# Patient Record
Sex: FEMALE | Race: WHITE | Employment: UNEMPLOYED | URBAN - METROPOLITAN AREA
[De-identification: names, ages, dates, MRNs, and addresses within clinical notes are randomized per-mention and may not be internally consistent; named-entity substitution may affect disease eponyms.]

---

## 2018-04-28 ENCOUNTER — APPOINTMENT (OUTPATIENT)
Dept: GENERAL RADIOLOGY | Age: 48
End: 2018-04-28
Attending: EMERGENCY MEDICINE
Payer: MEDICAID

## 2018-04-28 ENCOUNTER — HOSPITAL ENCOUNTER (EMERGENCY)
Age: 48
Discharge: HOME | End: 2018-04-28
Attending: EMERGENCY MEDICINE
Payer: MEDICAID

## 2018-04-28 VITALS
OXYGEN SATURATION: 99 % | HEART RATE: 94 BPM | TEMPERATURE: 98.3 F | DIASTOLIC BLOOD PRESSURE: 61 MMHG | WEIGHT: 180 LBS | RESPIRATION RATE: 20 BRPM | BODY MASS INDEX: 28.93 KG/M2 | SYSTOLIC BLOOD PRESSURE: 99 MMHG | HEIGHT: 66 IN

## 2018-04-28 DIAGNOSIS — J20.9 ACUTE BRONCHITIS, UNSPECIFIED ORGANISM: Primary | ICD-10-CM

## 2018-04-28 PROCEDURE — 99282 EMERGENCY DEPT VISIT SF MDM: CPT

## 2018-04-28 PROCEDURE — 94640 AIRWAY INHALATION TREATMENT: CPT

## 2018-04-28 PROCEDURE — 74011000250 HC RX REV CODE- 250: Performed by: EMERGENCY MEDICINE

## 2018-04-28 PROCEDURE — 77030013140 HC MSK NEB VYRM -A

## 2018-04-28 PROCEDURE — 71046 X-RAY EXAM CHEST 2 VIEWS: CPT

## 2018-04-28 RX ORDER — ALBUTEROL SULFATE 90 UG/1
2 AEROSOL, METERED RESPIRATORY (INHALATION)
Qty: 1 INHALER | Refills: 0 | Status: SHIPPED | OUTPATIENT
Start: 2018-04-28 | End: 2019-01-16

## 2018-04-28 RX ORDER — PREDNISONE 10 MG/1
TABLET ORAL
Qty: 21 TAB | Refills: 0 | Status: SHIPPED | OUTPATIENT
Start: 2018-04-28 | End: 2019-01-16

## 2018-04-28 RX ORDER — ALBUTEROL SULFATE 0.83 MG/ML
5 SOLUTION RESPIRATORY (INHALATION)
Qty: 1 PACKAGE | Refills: 0 | Status: SHIPPED | OUTPATIENT
Start: 2018-04-28 | End: 2019-01-16

## 2018-04-28 RX ORDER — BENZONATATE 200 MG/1
200 CAPSULE ORAL
Qty: 20 CAP | Refills: 0 | Status: SHIPPED | OUTPATIENT
Start: 2018-04-28 | End: 2018-05-05

## 2018-04-28 RX ORDER — IPRATROPIUM BROMIDE AND ALBUTEROL SULFATE 2.5; .5 MG/3ML; MG/3ML
3 SOLUTION RESPIRATORY (INHALATION)
Status: COMPLETED | OUTPATIENT
Start: 2018-04-28 | End: 2018-04-28

## 2018-04-28 RX ORDER — AZITHROMYCIN 250 MG/1
TABLET, FILM COATED ORAL
Qty: 6 TAB | Refills: 0 | Status: SHIPPED | OUTPATIENT
Start: 2018-04-28 | End: 2018-05-03

## 2018-04-28 RX ADMIN — IPRATROPIUM BROMIDE AND ALBUTEROL SULFATE 3 ML: .5; 3 SOLUTION RESPIRATORY (INHALATION) at 10:42

## 2018-04-28 NOTE — DISCHARGE INSTRUCTIONS
Bronchitis: Care Instructions  Your Care Instructions    Bronchitis is inflammation of the bronchial tubes, which carry air to the lungs. The tubes swell and produce mucus, or phlegm. The mucus and inflamed bronchial tubes make you cough. You may have trouble breathing. Most cases of bronchitis are caused by viruses like those that cause colds. Antibiotics usually do not help and they may be harmful. Bronchitis usually develops rapidly and lasts about 2 to 3 weeks in otherwise healthy people. Follow-up care is a key part of your treatment and safety. Be sure to make and go to all appointments, and call your doctor if you are having problems. It's also a good idea to know your test results and keep a list of the medicines you take. How can you care for yourself at home? · Take all medicines exactly as prescribed. Call your doctor if you think you are having a problem with your medicine. · Get some extra rest.  · Take an over-the-counter pain medicine, such as acetaminophen (Tylenol), ibuprofen (Advil, Motrin), or naproxen (Aleve) to reduce fever and relieve body aches. Read and follow all instructions on the label. · Do not take two or more pain medicines at the same time unless the doctor told you to. Many pain medicines have acetaminophen, which is Tylenol. Too much acetaminophen (Tylenol) can be harmful. · Take an over-the-counter cough medicine that contains dextromethorphan to help quiet a dry, hacking cough so that you can sleep. Avoid cough medicines that have more than one active ingredient. Read and follow all instructions on the label. · Breathe moist air from a humidifier, hot shower, or sink filled with hot water. The heat and moisture will thin mucus so you can cough it out. · Do not smoke. Smoking can make bronchitis worse. If you need help quitting, talk to your doctor about stop-smoking programs and medicines. These can increase your chances of quitting for good.   When should you call for help? Call 911 anytime you think you may need emergency care. For example, call if:  ? · You have severe trouble breathing. ?Call your doctor now or seek immediate medical care if:  ? · You have new or worse trouble breathing. ? · You cough up dark brown or bloody mucus (sputum). ? · You have a new or higher fever. ? · You have a new rash. ? Watch closely for changes in your health, and be sure to contact your doctor if:  ? · You cough more deeply or more often, especially if you notice more mucus or a change in the color of your mucus. ? · You are not getting better as expected. Where can you learn more? Go to http://shante-benito.info/. Enter H333 in the search box to learn more about \"Bronchitis: Care Instructions. \"  Current as of: May 12, 2017  Content Version: 11.4  © 8359-0836 "[employer]. Care instructions adapted under license by [employer] (which disclaims liability or warranty for this information). If you have questions about a medical condition or this instruction, always ask your healthcare professional. [address] any warranty or liability for your use of this information.

## 2018-04-28 NOTE — ED TRIAGE NOTES
Pt reports wheezing unrelieved by home medications x 3 days. Pt speaking in full, clear sentences, SpO2 98% on RA. Pt with hx of tracheotomy r/t esophageal atresia. Pt reports associated body aches, chills, and productive cough.

## 2019-01-16 ENCOUNTER — APPOINTMENT (OUTPATIENT)
Dept: GENERAL RADIOLOGY | Age: 49
End: 2019-01-16
Attending: EMERGENCY MEDICINE
Payer: MEDICAID

## 2019-01-16 ENCOUNTER — HOSPITAL ENCOUNTER (EMERGENCY)
Age: 49
Discharge: HOME | End: 2019-01-16
Attending: EMERGENCY MEDICINE
Payer: MEDICAID

## 2019-01-16 VITALS
HEART RATE: 90 BPM | SYSTOLIC BLOOD PRESSURE: 119 MMHG | TEMPERATURE: 98.2 F | BODY MASS INDEX: 28.93 KG/M2 | WEIGHT: 180 LBS | DIASTOLIC BLOOD PRESSURE: 68 MMHG | OXYGEN SATURATION: 98 % | RESPIRATION RATE: 14 BRPM | HEIGHT: 66 IN

## 2019-01-16 DIAGNOSIS — J20.9 ACUTE BRONCHITIS WITH BRONCHOSPASM: Primary | ICD-10-CM

## 2019-01-16 LAB
FLUAV AG NPH QL IA: NEGATIVE
FLUBV AG NOSE QL IA: NEGATIVE

## 2019-01-16 PROCEDURE — 87804 INFLUENZA ASSAY W/OPTIC: CPT

## 2019-01-16 PROCEDURE — 99281 EMR DPT VST MAYX REQ PHY/QHP: CPT

## 2019-01-16 PROCEDURE — 71046 X-RAY EXAM CHEST 2 VIEWS: CPT

## 2019-01-16 RX ORDER — ALBUTEROL SULFATE 1.25 MG/3ML
1.25 SOLUTION RESPIRATORY (INHALATION)
Qty: 25 EACH | Refills: 0 | Status: SHIPPED | OUTPATIENT
Start: 2019-01-16

## 2019-01-16 RX ORDER — PREDNISONE 10 MG/1
TABLET ORAL
Qty: 21 TAB | Refills: 0 | Status: SHIPPED | OUTPATIENT
Start: 2019-01-16 | End: 2021-07-18

## 2019-01-16 RX ORDER — AZITHROMYCIN 250 MG/1
TABLET, FILM COATED ORAL
Qty: 6 TAB | Refills: 0 | Status: SHIPPED | OUTPATIENT
Start: 2019-01-16 | End: 2019-01-21

## 2019-01-16 NOTE — ED PROVIDER NOTES
EMERGENCY DEPARTMENT HISTORY AND PHYSICAL EXAM 
 
Date: 1/16/2019 Patient Name: Clearance Mode History of Presenting Illness Chief Complaint Patient presents with  Cough History Provided By: patient Chief Complaint: cough Duration: 1 week Timing: acute Location: respiratory Modifying Factors: not improved with nebulizer Associated Symptoms: wheezing, congestion, body aches Additional History (Context):  
11:05 AM 
Clearance Mode is a 46 y.o. female with PMHX of asthma, hypothyroidism, pulmonary fibrosis, and esophageal atresia with tracheostomy who presents to the emergency department C/O productive cough with green phlegm productive for the past week. Associated sxs include wheezing, congestion, and body aches today. Pt gets infections frequently. Pt denies receiving flu vaccine this season. She has been using her nebulizer with minimal relief. Pt denies fever, chills, recent sick contacts, and any other sxs or complaints. PCP: Marcin Cortez NP Current Outpatient Medications Medication Sig Dispense Refill  albuterol (ACCUNEB) 1.25 mg/3 mL nebu Take 3 mL by inhalation every four (4) hours as needed (wheezing). 25 Each 0  
 azithromycin (ZITHROMAX Z-JOAN) 250 mg tablet Take 2 pills on day 1 and 1 pill on days 2-5 6 Tab 0  predniSONE (STERAPRED DS) 10 mg dose pack Take with food. 21 Tab 0  
 QUEtiapine (SEROQUEL) 100 mg tablet Take 100 mg by mouth two (2) times a day.  polyethylene glycol (MIRALAX) 17 gram packet Take 1 Packet by mouth daily. 5 Packet 0  
 levothyroxine (SYNTHROID) 50 mcg tablet Take  by mouth Daily (before breakfast).  buPROPion (WELLBUTRIN) 100 mg tablet Take 100 mg by mouth two (2) times a day.  divalproex DR (DEPAKOTE) 250 mg tablet Take 500 mg by mouth three (3) times daily.  clonazePAM (KLONOPIN) 1 mg tablet Take 1 mg by mouth three (3) times daily.  dicyclomine (BENTYL) 20 mg tablet Take 20 mg by mouth every six (6) hours.  ondansetron (ZOFRAN ODT) 4 mg disintegrating tablet Take 1 Tab by mouth every eight (8) hours as needed for Nausea. 12 Tab 0  
 ondansetron (ZOFRAN ODT) 4 mg disintegrating tablet Take 1 Tab by mouth every eight (8) hours as needed for Nausea. 6 Tab 0  
 tiotropium (SPIRIVA WITH HANDIHALER) 18 mcg inhalation capsule Take 1 Cap by inhalation daily.  guaiFENesin-codeine (ROBITUSSIN AC)  mg/5 mL solution Take 5-10 mL by mouth four (4) times daily as needed for Cough. Max Daily Amount: 40 mL. 100 mL 0  
 naproxen (NAPROSYN) 500 mg tablet Take 500 mg by mouth two (2) times daily (with meals). Past History Past Medical History: 
Past Medical History:  
Diagnosis Date  Asthma  Bulimia   
 i throw up alot  Gastrointestinal disorder   
 ibs  Hypothyroid  Ill-defined condition   
 pre-diabetes  Osteoarthritis  Psychiatric disorder   
 bipolar  Pulmonary fibrosis ([facility] [address]  Tracheostomy care ([facility] [address] Past Surgical History: 
Past Surgical History:  
Procedure Laterality Date  ABDOMEN SURGERY PROC UNLISTED    
 hysterectomy  HX ORTHOPAEDIC    
 scope to right knee  HX OTHER SURGICAL    
 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 
History reviewed. No pertinent family history. Social History: 
Social History Tobacco Use  Smoking status: Never Smoker  Smokeless tobacco: Never Used Substance Use Topics  Alcohol use: No  
 Drug use: No  
 
 
Allergies: Allergies Allergen Reactions  Compazine [Prochlorperazine Edisylate] Anaphylaxis  Sulfa (Sulfonamide Antibiotics) Anaphylaxis  Sulfa Dyne Anaphylaxis Review of Systems Review of Systems Constitutional: Negative for chills and fever. HENT: Negative for sore throat. Respiratory: Positive for cough and wheezing. Gastrointestinal: Negative for nausea and vomiting. Musculoskeletal: Positive for myalgias. Neurological: Negative for dizziness. Hematological: Negative for adenopathy. All other systems reviewed and are negative. Physical Exam  
 
Vitals:  
 01/16/19 1045 BP: 119/68 Pulse: 90 Resp: 14 Temp: 98.2 °F (36.8 °C) SpO2: 98% Weight: 81.6 kg (180 lb) Height: 5' 6\" (1.676 m) Physical Exam  
Constitutional: She is oriented to person, place, and time. She appears well-developed and well-nourished. No distress.  female in NAD. Alert. No resp distress or acc muscle use. HENT:  
Head: Normocephalic and atraumatic. Right Ear: External ear normal. No swelling or tenderness. Tympanic membrane is not perforated, not erythematous and not bulging. Left Ear: External ear normal. No swelling or tenderness. Tympanic membrane is not perforated, not erythematous and not bulging. Nose: Mucosal edema present. No rhinorrhea. Right sinus exhibits no maxillary sinus tenderness and no frontal sinus tenderness. Left sinus exhibits no maxillary sinus tenderness and no frontal sinus tenderness. Mouth/Throat: Uvula is midline, oropharynx is clear and moist and mucous membranes are normal. No oral lesions. No trismus in the jaw. No dental abscesses or uvula swelling. No oropharyngeal exudate, posterior oropharyngeal edema, posterior oropharyngeal erythema or tonsillar abscesses. Eyes: Conjunctivae are normal.  
Neck: Normal range of motion. Cardiovascular: Normal rate, regular rhythm, normal heart sounds and intact distal pulses. Exam reveals no gallop and no friction rub. No murmur heard. Pulmonary/Chest: Effort normal and breath sounds normal. No accessory muscle usage. No tachypnea. No respiratory distress. She has no decreased breath sounds. She has no wheezes. She has no rhonchi. She has no rales. Musculoskeletal: Normal range of motion. Neurological: She is alert and oriented to person, place, and time. Skin: Skin is warm and dry. She is not diaphoretic. Psychiatric: She has a normal mood and affect. Judgment normal.  
Nursing note and vitals reviewed. Diagnostic Study Results Labs - Recent Results (from the past 12 hour(s)) INFLUENZA A & B AG (RAPID TEST) Collection Time: 01/16/19 11:30 AM  
Result Value Ref Range Influenza A Antigen NEGATIVE  NEG Influenza B Antigen NEGATIVE  NEG Radiologic Studies -  
XR CHEST PA LAT Final Result Impression: No acute cardiopulmonary disease or significant interval change. CT Results  (Last 48 hours) None CXR Results  (Last 48 hours) 01/16/19 1106  XR CHEST PA LAT Final result Impression:  Impression: No acute cardiopulmonary disease or significant interval change. Narrative:  HISTORY:   
-From Provider: cough  
-Additional: None Technique: CHEST PA AND LATERAL VIEWS Comparison study:04/28/18. Findings:  
   
 No acute consolidation, congestive heart failure, pleural effusion or  
pneumothorax. There is fusion of the right 4th and 5th posterior ribs, stable  
from the prior. Medications given in the ED- Medications - No data to display Medical Decision Making I am the first provider for this patient. I reviewed the vital signs, available nursing notes, past medical history, past surgical history, family history and social history. Vital Signs-Reviewed the patient's vital signs. Pulse Oximetry Analysis - 98% on RA Records Reviewed: Nursing Notes and Old Medical Records Provider Notes (Medical Decision Making): URI, strep, sinusitis, allergic, PNA, bronchitis, asthma, allergic Procedures: 
Procedures ED Course:  
11:05 AM Initial assessment performed.  The patients presenting problems have been discussed, and they are in agreement with the care plan formulated and outlined with them. I have encouraged them to ask questions as they arise throughout their visit. Diagnosis and Disposition Afebrile. Lungs CTAB. No resp distress or acc muscle use. CXR clear. Influenza neg. Will tx with ABX given hx. PCP FU. Reasons to RTED discussed with pt. All questions answered. Pt feels comfortable going home at this time. Pt expressed understanding and she agrees with plan. DISCHARGE NOTE: 
 
Tang Deleon's  results have been reviewed with her. She has been counseled regarding her diagnosis, treatment, and plan. She verbally conveys understanding and agreement of the signs, symptoms, diagnosis, treatment and prognosis and additionally agrees to follow up as discussed. She also agrees with the care-plan and conveys that all of her questions have been answered. I have also provided discharge instructions for her that include: educational information regarding their diagnosis and treatment, and list of reasons why they would want to return to the ED prior to their follow-up appointment, should her condition change. She has been provided with education for proper emergency department utilization. CLINICAL IMPRESSION: 
 
1. Acute bronchitis with bronchospasm PLAN: 
1. D/C Home 2. Current Discharge Medication List  
  
START taking these medications Details  
albuterol (ACCUNEB) 1.25 mg/3 mL nebu Take 3 mL by inhalation every four (4) hours as needed (wheezing). Qty: 25 Each, Refills: 0  
  
azithromycin (ZITHROMAX Z-JOAN) 250 mg tablet Take 2 pills on day 1 and 1 pill on days 2-5 Qty: 6 Tab, Refills: 0 CONTINUE these medications which have CHANGED Details  
predniSONE (STERAPRED DS) 10 mg dose pack Take with food. Qty: 21 Tab, Refills: 0 STOP taking these medications  
  
 albuterol (PROVENTIL HFA, VENTOLIN HFA, PROAIR HFA) 90 mcg/actuation inhaler Comments: Reason for Stopping:   
   
 albuterol (PROVENTIL HFA, VENTOLIN HFA, PROAIR HFA) 90 mcg/actuation inhaler Comments:  
Reason for Stopping:   
   
 albuterol (PROVENTIL VENTOLIN) 2.5 mg /3 mL (0.083 %) nebulizer solution Comments:  
Reason for Stopping: 3.  
Follow-up Information Follow up With Specialties Details Why Contact Info Zhang Stout NP Nurse Practitioner   [address] [facility] [address] [facility] [address] 
[phone] [facility] EMERGENCY DEPT Emergency Medicine  As needed, If symptoms worsen [address] [place] [address] 
[phone]  
  
 
_______________________________ Attestations: This note is prepared by Jannie Gaston, acting as Scribe for Meagan Tiwari PA-C. Meagan Tiwari PA-C:  The scribe's documentation has been prepared under my direction and personally reviewed by me in its entirety. I confirm that the note above accurately reflects all work, treatment, procedures, and medical decision making performed by me.

## 2019-01-16 NOTE — ED TRIAGE NOTES
Patient arrived ambulatory c/o productive cough x1 week. Sputum is green per pt report. Patient also reports body aches.

## 2019-10-23 ENCOUNTER — HOSPITAL ENCOUNTER (EMERGENCY)
Age: 49
Discharge: HOME | End: 2019-10-23
Attending: EMERGENCY MEDICINE
Payer: MEDICAID

## 2019-10-23 ENCOUNTER — APPOINTMENT (OUTPATIENT)
Dept: GENERAL RADIOLOGY | Age: 49
End: 2019-10-23
Attending: PHYSICIAN ASSISTANT
Payer: MEDICAID

## 2019-10-23 VITALS
RESPIRATION RATE: 14 BRPM | BODY MASS INDEX: 28.93 KG/M2 | DIASTOLIC BLOOD PRESSURE: 81 MMHG | SYSTOLIC BLOOD PRESSURE: 119 MMHG | OXYGEN SATURATION: 97 % | WEIGHT: 180 LBS | HEIGHT: 66 IN | TEMPERATURE: 98 F | HEART RATE: 95 BPM

## 2019-10-23 DIAGNOSIS — S16.1XXA STRAIN OF NECK MUSCLE, INITIAL ENCOUNTER: ICD-10-CM
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
## 2019-10-23 PROCEDURE — 99283 EMERGENCY DEPT VISIT LOW MDM: CPT

## 2019-10-23 PROCEDURE — 72050 X-RAY EXAM NECK SPINE 4/5VWS: CPT

## 2019-10-23 RX ORDER — OMEPRAZOLE 10 MG/1
10 CAPSULE, DELAYED RELEASE ORAL DAILY
COMMUNITY

## 2019-10-23 RX ORDER — CYCLOBENZAPRINE HCL 10 MG
10 TABLET ORAL
Qty: 15 TAB | Refills: 0 | Status: SHIPPED | OUTPATIENT
Start: 2019-10-23 | End: 2021-07-18
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
## 2019-11-24 ENCOUNTER — APPOINTMENT (OUTPATIENT)
Dept: CT IMAGING | Age: 49
End: 2019-11-24
Attending: EMERGENCY MEDICINE
Payer: MEDICAID

## 2019-11-24 ENCOUNTER — APPOINTMENT (OUTPATIENT)
Dept: GENERAL RADIOLOGY | Age: 49
End: 2019-11-24
Attending: EMERGENCY MEDICINE
Payer: MEDICAID

## 2019-11-24 ENCOUNTER — HOSPITAL ENCOUNTER (EMERGENCY)
Age: 49
Discharge: HOME | End: 2019-11-24
Attending: EMERGENCY MEDICINE
Payer: MEDICAID

## 2019-11-24 VITALS
BODY MASS INDEX: 28.12 KG/M2 | HEART RATE: 73 BPM | SYSTOLIC BLOOD PRESSURE: 117 MMHG | OXYGEN SATURATION: 100 % | HEIGHT: 66 IN | DIASTOLIC BLOOD PRESSURE: 63 MMHG | TEMPERATURE: 98.5 F | RESPIRATION RATE: 14 BRPM | WEIGHT: 175 LBS

## 2019-11-24 DIAGNOSIS — R20.0 LEFT ARM NUMBNESS: Primary | ICD-10-CM

## 2019-11-24 DIAGNOSIS — R41.0 TRANSIENT CONFUSION: ICD-10-CM

## 2019-11-24 LAB
ALBUMIN SERPL-MCNC: 3.8 G/DL (ref 3.4–5)
ALBUMIN/GLOB SERPL: 1 {RATIO} (ref 0.8–1.7)
ALP SERPL-CCNC: 68 U/L (ref 45–117)
ALT SERPL-CCNC: 18 U/L (ref 13–56)
ANION GAP SERPL CALC-SCNC: 8 MMOL/L (ref 3–18)
APPEARANCE UR: CLEAR
AST SERPL-CCNC: 8 U/L (ref 10–38)
BASOPHILS # BLD: 0.1 K/UL (ref 0–0.1)
BASOPHILS NFR BLD: 1 % (ref 0–2)
BILIRUB SERPL-MCNC: 0.2 MG/DL (ref 0.2–1)
BILIRUB UR QL: NEGATIVE
BNP SERPL-MCNC: 24 PG/ML (ref 0–900)
BUN SERPL-MCNC: 21 MG/DL (ref 7–18)
BUN/CREAT SERPL: 24 (ref 12–20)
CALCIUM SERPL-MCNC: 8.7 MG/DL (ref 8.5–10.1)
CHLORIDE SERPL-SCNC: 103 MMOL/L (ref 100–111)
CK MB CFR SERPL CALC: NORMAL % (ref 0–4)
CK MB SERPL-MCNC: <1 NG/ML (ref 5–25)
CK SERPL-CCNC: 60 U/L (ref 26–192)
CO2 SERPL-SCNC: 28 MMOL/L (ref 21–32)
COLOR UR: YELLOW
CREAT SERPL-MCNC: 0.87 MG/DL (ref 0.6–1.3)
DIFFERENTIAL METHOD BLD: NORMAL
EOSINOPHIL # BLD: 0.1 K/UL (ref 0–0.4)
EOSINOPHIL NFR BLD: 2 % (ref 0–5)
ERYTHROCYTE [DISTWIDTH] IN BLOOD BY AUTOMATED COUNT: 12.2 % (ref 11.6–14.5)
GLOBULIN SER CALC-MCNC: 3.7 G/DL (ref 2–4)
GLUCOSE SERPL-MCNC: 105 MG/DL (ref 74–99)
GLUCOSE UR STRIP.AUTO-MCNC: NEGATIVE MG/DL
HCT VFR BLD AUTO: 40.8 % (ref 35–45)
HGB BLD-MCNC: 13.4 G/DL (ref 12–16)
HGB UR QL STRIP: NEGATIVE
KETONES UR QL STRIP.AUTO: NEGATIVE MG/DL
LEUKOCYTE ESTERASE UR QL STRIP.AUTO: NEGATIVE
LYMPHOCYTES # BLD: 2.6 K/UL (ref 0.9–3.6)
LYMPHOCYTES NFR BLD: 37 % (ref 21–52)
MCH RBC QN AUTO: 31 PG (ref 24–34)
MCHC RBC AUTO-ENTMCNC: 32.8 G/DL (ref 31–37)
MCV RBC AUTO: 94.4 FL (ref 74–97)
MONOCYTES # BLD: 0.6 K/UL (ref 0.05–1.2)
MONOCYTES NFR BLD: 8 % (ref 3–10)
NEUTS SEG # BLD: 3.6 K/UL (ref 1.8–8)
NEUTS SEG NFR BLD: 52 % (ref 40–73)
NITRITE UR QL STRIP.AUTO: NEGATIVE
PH UR STRIP: 6.5 [PH] (ref 5–8)
PLATELET # BLD AUTO: 254 K/UL (ref 135–420)
PMV BLD AUTO: 10.1 FL (ref 9.2–11.8)
POTASSIUM SERPL-SCNC: 3.6 MMOL/L (ref 3.5–5.5)
PROT SERPL-MCNC: 7.5 G/DL (ref 6.4–8.2)
PROT UR STRIP-MCNC: NEGATIVE MG/DL
RBC # BLD AUTO: 4.32 M/UL (ref 4.2–5.3)
SODIUM SERPL-SCNC: 139 MMOL/L (ref 136–145)
SP GR UR REFRACTOMETRY: 1.02 (ref 1–1.03)
TROPONIN I SERPL-MCNC: <0.02 NG/ML (ref 0–0.04)
UROBILINOGEN UR QL STRIP.AUTO: 1 EU/DL (ref 0.2–1)
WBC # BLD AUTO: 6.9 K/UL (ref 4.6–13.2)

## 2019-11-24 PROCEDURE — 83880 ASSAY OF NATRIURETIC PEPTIDE: CPT

## 2019-11-24 PROCEDURE — 71045 X-RAY EXAM CHEST 1 VIEW: CPT

## 2019-11-24 PROCEDURE — 99284 EMERGENCY DEPT VISIT MOD MDM: CPT

## 2019-11-24 PROCEDURE — 80053 COMPREHEN METABOLIC PANEL: CPT

## 2019-11-24 PROCEDURE — 70450 CT HEAD/BRAIN W/O DYE: CPT

## 2019-11-24 PROCEDURE — 82550 ASSAY OF CK (CPK): CPT

## 2019-11-24 PROCEDURE — 93005 ELECTROCARDIOGRAM TRACING: CPT

## 2019-11-24 PROCEDURE — 85025 COMPLETE CBC W/AUTO DIFF WBC: CPT

## 2019-11-24 PROCEDURE — 81003 URINALYSIS AUTO W/O SCOPE: CPT

## 2019-11-25 LAB
ATRIAL RATE: 74 BPM
CALCULATED P AXIS, ECG09: 23 DEGREES
CALCULATED R AXIS, ECG10: 2 DEGREES
CALCULATED T AXIS, ECG11: -4 DEGREES
DIAGNOSIS, 93000: NORMAL
P-R INTERVAL, ECG05: 144 MS
Q-T INTERVAL, ECG07: 414 MS
QRS DURATION, ECG06: 88 MS
QTC CALCULATION (BEZET), ECG08: 459 MS
VENTRICULAR RATE, ECG03: 74 BPM

## 2019-11-25 NOTE — ED TRIAGE NOTES
Pt C/O left arm pain x a few hours and patient also C/O feeling confused. Pt drove self here and has been able to answer all questions appropriately.

## 2019-11-25 NOTE — ED PROVIDER NOTES
EMERGENCY DEPARTMENT HISTORY AND PHYSICAL EXAM    Date: 11/24/2019  Patient Name: Mago Aldrich    History of Presenting Illness     Chief Complaint   Patient presents with    Arm Pain         History Provided By: Patient      Mago Aldrich is a 44 y.o. female with PMHX of esophageal atresia with prior tracheostomy, pulmonary fibrosis, bipolar disorder, bulimia, IBS who presents to the emergency department C/O left arm pain, confusion. States her symptoms started about 4 hours ago. States she is also feeling a little bit of heaviness and weakness to the left arm from her elbow down to her fingertips mostly on the 4th and 5th digit. States she has never had this type of problem before but does note that she has been under a lot of stress. She states the confusion was mostly described as having some episodes of forgetfulness such as forgetting her Social Security number. She states she was able to drive herself here and denies any other complaints. Denies any chest pain, shortness of breath, headache, blurry vision, leg weakness or numbness. Radha Mcnamara PCP: Kimberley Muniz NP    Current Outpatient Medications   Medication Sig Dispense Refill    omeprazole (PRILOSEC) 10 mg capsule Take 10 mg by mouth daily.  cyclobenzaprine (FLEXERIL) 10 mg tablet Take 1 Tab by mouth three (3) times daily as needed for Muscle Spasm(s). 15 Tab 0    albuterol (ACCUNEB) 1.25 mg/3 mL nebu Take 3 mL by inhalation every four (4) hours as needed (wheezing). 25 Each 0    predniSONE (STERAPRED DS) 10 mg dose pack Take with food. 21 Tab 0    QUEtiapine (SEROQUEL) 100 mg tablet Take 100 mg by mouth two (2) times a day.  ondansetron (ZOFRAN ODT) 4 mg disintegrating tablet Take 1 Tab by mouth every eight (8) hours as needed for Nausea. 12 Tab 0    polyethylene glycol (MIRALAX) 17 gram packet Take 1 Packet by mouth daily.  5 Packet 0    ondansetron (ZOFRAN ODT) 4 mg disintegrating tablet Take 1 Tab by mouth every eight (8) hours as needed for Nausea. 6 Tab 0    tiotropium (SPIRIVA WITH HANDIHALER) 18 mcg inhalation capsule Take 1 Cap by inhalation daily.  guaiFENesin-codeine (ROBITUSSIN AC)  mg/5 mL solution Take 5-10 mL by mouth four (4) times daily as needed for Cough. Max Daily Amount: 40 mL. 100 mL 0    naproxen (NAPROSYN) 500 mg tablet Take 500 mg by mouth two (2) times daily (with meals).  levothyroxine (SYNTHROID) 50 mcg tablet Take  by mouth Daily (before breakfast).  buPROPion (WELLBUTRIN) 100 mg tablet Take 100 mg by mouth two (2) times a day.  divalproex DR (DEPAKOTE) 250 mg tablet Take 500 mg by mouth three (3) times daily.  clonazePAM (KLONOPIN) 1 mg tablet Take 1 mg by mouth three (3) times daily.  dicyclomine (BENTYL) 20 mg tablet Take 20 mg by mouth every six (6) hours. Past History     Past Medical History:  Past Medical History:   Diagnosis Date    Asthma     Bulimia     i throw up alot    Gastrointestinal disorder     ibs    Hypothyroid     Ill-defined condition     pre-diabetes    Osteoarthritis     Psychiatric disorder     bipolar    Pulmonary fibrosis ([facility] [address]     Tracheostomy care [facility])        Past Surgical History:  Past Surgical History:   Procedure Laterality Date    ABDOMEN SURGERY PROC UNLISTED      hysterectomy    HX ORTHOPAEDIC      scope to right knee    HX OTHER SURGICAL      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 No pertinent family history. Social History:  Social History     Tobacco Use    Smoking status: Never Smoker    Smokeless tobacco: Never Used   Substance Use Topics    Alcohol use: No    Drug use: No       Allergies: Allergies   Allergen Reactions    Compazine [Prochlorperazine Edisylate] Anaphylaxis    Sulfa (Sulfonamide Antibiotics) Anaphylaxis    Sulfa Dyne Anaphylaxis         Review of Systems   Review of Systems   Constitutional: Negative for fever.    Respiratory: Negative for shortness of breath. Cardiovascular: Negative for chest pain. Gastrointestinal: Negative for abdominal pain, nausea and vomiting. Genitourinary: Negative for flank pain. Musculoskeletal: Negative for neck pain and neck stiffness. Neurological: Positive for weakness. Negative for dizziness, light-headedness and headaches. Psychiatric/Behavioral: Positive for confusion.          Physical Exam     Vitals:    11/24/19 2041   Pulse: 78   Resp: 14   Temp: 98.5 °F (36.9 °C)   SpO2: 100%   Weight: 79.4 kg (175 lb)   Height: 5' 6\" (1.676 m)     Physical Exam    Nursing notes and vital signs reviewed    Constitutional: Non toxic appearing, no acute distress  Head: Normocephalic, Atraumatic  Eyes: Pupils are equal, round, and reactive to light, EOMI  Neck: Supple, prior tracheostomy noted, hoarse voice  Cardiovascular: Regular rate and rhythm, no murmurs, rubs, or gallops  Chest: Normal work of breathing and chest excursion bilaterally  Lungs: Clear to ausculation bilaterally  Abdomen: Soft, non tender, non distended, normoactive bowel sounds  Back: No evidence of trauma or deformity  Extremities: No evidence of trauma or deformity, no LE edema  Skin: Warm and dry, normal cap refill  Neuro: Alert and appropriate, CN intact, normal speech, strength is 4 out of 5 in the left upper extremity versus the right, sensation full and symmetric bilaterally, normal gait, normal coordination  Psychiatric: Normal mood and affect      Diagnostic Study Results     Labs -     Recent Results (from the past 48 hour(s))   EKG, 12 LEAD, INITIAL    Collection Time: 11/24/19  8:41 PM   Result Value Ref Range    Ventricular Rate 74 BPM    Atrial Rate 74 BPM    P-R Interval 144 ms    QRS Duration 88 ms    Q-T Interval 414 ms    QTC Calculation (Bezet) 459 ms    Calculated P Axis 23 degrees    Calculated R Axis 2 degrees    Calculated T Axis -4 degrees    Diagnosis       Normal sinus rhythm  Cannot rule out Anterior infarct , age undetermined  Abnormal ECG  When compared with ECG of 25-JUN-2016 16:02,  Nonspecific T wave abnormality now evident in Lateral leads     CBC WITH AUTOMATED DIFF    Collection Time: 11/24/19  8:50 PM   Result Value Ref Range    WBC 6.9 4.6 - 13.2 K/uL    RBC 4.32 4.20 - 5.30 M/uL    HGB 13.4 12.0 - 16.0 g/dL    HCT 40.8 35.0 - 45.0 %    MCV 94.4 74.0 - 97.0 FL    MCH 31.0 24.0 - 34.0 PG    MCHC 32.8 31.0 - 37.0 g/dL    RDW 12.2 11.6 - 14.5 %    PLATELET 974 347 - 195 K/uL    MPV 10.1 9.2 - 11.8 FL    NEUTROPHILS 52 40 - 73 %    LYMPHOCYTES 37 21 - 52 %    MONOCYTES 8 3 - 10 %    EOSINOPHILS 2 0 - 5 %    BASOPHILS 1 0 - 2 %    ABS. NEUTROPHILS 3.6 1.8 - 8.0 K/UL    ABS. LYMPHOCYTES 2.6 0.9 - 3.6 K/UL    ABS. MONOCYTES 0.6 0.05 - 1.2 K/UL    ABS. EOSINOPHILS 0.1 0.0 - 0.4 K/UL    ABS. BASOPHILS 0.1 0.0 - 0.1 K/UL    DF AUTOMATED     METABOLIC PANEL, COMPREHENSIVE    Collection Time: 11/24/19  8:50 PM   Result Value Ref Range    Sodium 139 136 - 145 mmol/L    Potassium 3.6 3.5 - 5.5 mmol/L    Chloride 103 100 - 111 mmol/L    CO2 28 21 - 32 mmol/L    Anion gap 8 3.0 - 18 mmol/L    Glucose 105 (H) 74 - 99 mg/dL    BUN 21 (H) 7.0 - 18 MG/DL    Creatinine 0.87 0.6 - 1.3 MG/DL    BUN/Creatinine ratio 24 (H) 12 - 20      GFR est AA >60 >60 ml/min/1.73m2    GFR est non-AA >60 >60 ml/min/1.73m2    Calcium 8.7 8.5 - 10.1 MG/DL    Bilirubin, total 0.2 0.2 - 1.0 MG/DL    ALT (SGPT) 18 13 - 56 U/L    AST (SGOT) 8 (L) 10 - 38 U/L    Alk.  phosphatase 68 45 - 117 U/L    Protein, total 7.5 6.4 - 8.2 g/dL    Albumin 3.8 3.4 - 5.0 g/dL    Globulin 3.7 2.0 - 4.0 g/dL    A-G Ratio 1.0 0.8 - 1.7     CARDIAC PANEL,(CK, CKMB & TROPONIN)    Collection Time: 11/24/19  8:50 PM   Result Value Ref Range    CK 60 26 - 192 U/L    CK - MB <1.0 <3.6 ng/ml    CK-MB Index  0.0 - 4.0 %     CALCULATION NOT PERFORMED WHEN RESULT IS BELOW LINEAR LIMIT    Troponin-I, QT <0.02 0.0 - 0.045 NG/ML   NT-PRO BNP    Collection Time: 11/24/19  8:50 PM   Result Value Ref Range    NT pro-BNP 24 0 - 900 PG/ML   URINALYSIS W/ RFLX MICROSCOPIC    Collection Time: 11/24/19  9:16 PM   Result Value Ref Range    Color YELLOW      Appearance CLEAR      Specific gravity 1.024 1.005 - 1.030      pH (UA) 6.5 5.0 - 8.0      Protein NEGATIVE  NEG mg/dL    Glucose NEGATIVE  NEG mg/dL    Ketone NEGATIVE  NEG mg/dL    Bilirubin NEGATIVE  NEG      Blood NEGATIVE  NEG      Urobilinogen 1.0 0.2 - 1.0 EU/dL    Nitrites NEGATIVE  NEG      Leukocyte Esterase NEGATIVE  NEG         Radiologic Studies -   CT HEAD WO CONT   Final Result   IMPRESSION:      No acute intracranial process, specifically, no evidence of intracranial   hemorrhage, mass effect, or midline shift. XR CHEST PORT   Final Result   IMPRESSION:      No acute cardiopulmonary process. CT Results  (Last 48 hours)               11/24/19 2125  CT HEAD WO CONT Final result    Impression:  IMPRESSION:       No acute intracranial process, specifically, no evidence of intracranial   hemorrhage, mass effect, or midline shift. Narrative:  EXAM: CT head       CLINICAL INDICATION/HISTORY: Left arm weakness. COMPARISON: None. TECHNIQUE: Axial CT imaging of the head was performed without intravenous   contrast.       One or more dose reduction techniques were used on this CT: automated exposure   control, adjustment of the mAs and/or kVp according to patient size, and   iterative reconstruction techniques. The specific techniques used on this CT   exam have been documented in the patient's electronic medical record. Digital   Imaging and Communications in Medicine (DICOM) format image data are available   to nonaffiliated external healthcare facilities or entities on a secure, media   free, reciprocally searchable basis with patient authorization for at least a   12-month period after this study.        _______________       FINDINGS:       BRAIN AND POSTERIOR FOSSA: There is no intracranial hemorrhage, mass effect, or   midline shift. The sulci, folia, ventricles and basal cisterns are within normal   limits. There are no areas of abnormal parenchymal attenuation. EXTRA-AXIAL SPACES AND MENINGES: There are no abnormal extra-axial fluid   collections. CALVARIUM: Intact. SINUSES: Clear. OTHER: None.       _______________               CXR Results  (Last 48 hours)               11/24/19 2107  XR CHEST PORT Final result    Impression:  IMPRESSION:       No acute cardiopulmonary process. Narrative:  EXAM: One view chest x-ray       CLINICAL INDICATION/HISTORY: Altered mental status and left arm pain. COMPARISON: 01/16/2019. TECHNIQUE: Single AP view of the chest was obtained.       _______________       FINDINGS:       HEART, VESSELS, MEDIASTINUM: Heart size is normal. No vascular congestion. LUNGS, PLEURAL SPACES: The lungs are clear. No effusion or pneumothorax. BONY THORAX, SOFT TISSUES: Unremarkable.       _______________                 Medications given in the ED-  Medications - No data to display      Medical Decision Making   I am the first provider for this patient. I reviewed the vital signs, available nursing notes, past medical history, past surgical history, family history and social history. Vital Signs-Reviewed the patient's vital signs. Pulse Oximetry Analysis - 100% on room air     Cardiac Monitor:  Rate: 78 bpm  Rhythm: sinus    EKG interpretation: (Preliminary)  EKG read by Dr. Nicole Alamo 74 normal sinus rhythm, normal axis, nonspecific T wave changes in the inferior and lateral leads, no ST changes    Records Reviewed: Nursing Notes    Procedures:  Procedures    ED Course:   Low likelihood of actual strokelike symptoms as her symptoms appear more peripheral than central in nature although will obtain CT scan of the brain as well as cardiac rule out    Laboratory findings unremarkable.   Chest x-ray normal.  CT scan showed no evidence of acute process. Patient states her symptoms are improving on their own spontaneously. I discussed with the patient that her symptoms could be due to a pinched nerve versus increased stress versus other etiology. I recommended she follow-up with her primary care physician for reevaluation and long-term management otherwise come back to the emergency department if symptoms worsen. She understands and agrees to plan    Diagnosis and Disposition         DISCHARGE NOTE:    Jenifer Deleon's  results have been reviewed with her. She has been counseled regarding her diagnosis, treatment, and plan. She verbally conveys understanding and agreement of the signs, symptoms, diagnosis, treatment and prognosis and additionally agrees to follow up as discussed. She also agrees with the care-plan and conveys that all of her questions have been answered. I have also provided discharge instructions for her that include: educational information regarding their diagnosis and treatment, and list of reasons why they would want to return to the ED prior to their follow-up appointment, should her condition change. She has been provided with education for proper emergency department utilization. CLINICAL IMPRESSION:    1. Left arm numbness    2. Transient confusion        PLAN:  1. D/C Home  2. Current Discharge Medication List        3. Follow-up Information     Follow up With Specialties Details Why Contact Info    Franc Moses NP Nurse Practitioner Schedule an appointment as soon as possible for a visit in 2 days As needed, If symptoms worsen [address] [facility]  [phone]          _______________________________      Please note that this dictation was completed with [employer], the computer voice recognition software. Quite often unanticipated grammatical, syntax, homophones, and other interpretive errors are inadvertently transcribed by the computer software.   Please disregard these errors. Please excuse any errors that have escaped final proofreading.

## 2021-07-18 ENCOUNTER — APPOINTMENT (OUTPATIENT)
Dept: GENERAL RADIOLOGY | Age: 51
End: 2021-07-18
Attending: PHYSICIAN ASSISTANT
Payer: MEDICAID

## 2021-07-18 ENCOUNTER — HOSPITAL ENCOUNTER (EMERGENCY)
Age: 51
Discharge: HOME | End: 2021-07-18
Attending: EMERGENCY MEDICINE
Payer: MEDICAID

## 2021-07-18 VITALS
WEIGHT: 160 LBS | SYSTOLIC BLOOD PRESSURE: 112 MMHG | OXYGEN SATURATION: 100 % | RESPIRATION RATE: 20 BRPM | HEART RATE: 88 BPM | TEMPERATURE: 97.5 F | BODY MASS INDEX: 25.71 KG/M2 | DIASTOLIC BLOOD PRESSURE: 79 MMHG | HEIGHT: 66 IN

## 2021-07-18 DIAGNOSIS — L03.032 CELLULITIS OF THIRD TOE OF LEFT FOOT: Primary | ICD-10-CM

## 2021-07-18 DIAGNOSIS — Z23 NEED FOR DIPHTHERIA-TETANUS-PERTUSSIS (TDAP) VACCINE: ICD-10-CM

## 2021-07-18 PROCEDURE — 90715 TDAP VACCINE 7 YRS/> IM: CPT | Performed by: PHYSICIAN ASSISTANT

## 2021-07-18 PROCEDURE — 90471 IMMUNIZATION ADMIN: CPT

## 2021-07-18 PROCEDURE — 99281 EMR DPT VST MAYX REQ PHY/QHP: CPT

## 2021-07-18 PROCEDURE — 73630 X-RAY EXAM OF FOOT: CPT

## 2021-07-18 PROCEDURE — 74011250636 HC RX REV CODE- 250/636: Performed by: PHYSICIAN ASSISTANT

## 2021-07-18 RX ORDER — ESTRADIOL 0.03 MG/D
FILM, EXTENDED RELEASE TRANSDERMAL
COMMUNITY

## 2021-07-18 RX ORDER — DOXYCYCLINE HYCLATE 100 MG
100 TABLET ORAL 2 TIMES DAILY
Qty: 20 TABLET | Refills: 0 | Status: SHIPPED | OUTPATIENT
Start: 2021-07-18 | End: 2021-07-28

## 2021-07-18 RX ORDER — DOXYCYCLINE HYCLATE 100 MG
100 TABLET ORAL 2 TIMES DAILY
Qty: 20 TABLET | Refills: 0 | Status: SHIPPED | OUTPATIENT
Start: 2021-07-18 | End: 2021-07-18 | Stop reason: SDUPTHER

## 2021-07-18 RX ADMIN — TETANUS TOXOID, REDUCED DIPHTHERIA TOXOID AND ACELLULAR PERTUSSIS VACCINE, ADSORBED 0.5 ML: 5; 2.5; 8; 8; 2.5 SUSPENSION INTRAMUSCULAR at 09:59

## 2021-07-18 NOTE — ED TRIAGE NOTES
Patient states [place]. Patient with complaints of pain, pressure and redness to the left 3rd toe.

## 2021-07-18 NOTE — ED PROVIDER NOTES
EMERGENCY DEPARTMENT HISTORY AND PHYSICAL EXAM    Date: 7/18/2021  Patient Name: Isis Hernandez    History of Presenting Illness     Chief Complaint   Patient presents with    Toe Pain         History Provided By: Patient    Chief Complaint: left third toe swelling    HPI(Context):   9:32 AM  Isis Hernandez is a 44 y.o. female with PMHX of bipolar, asthma, OA, IBS who presents to the emergency department C/O left third toe swelling. Associated sxs include left third toe pain and erythema. Pt was at beach 2 weeks ago when she cute her left foot. Wound was healing and then last night she began to feel pain and swelling. Pt unsure of FB. Pt unsure of last tetanus. Pt denies numbness, weakness, hx of DMII, and any other sxs or complaints. PCP: Ivan De Anda NP    Current Outpatient Medications   Medication Sig Dispense Refill    estradioL 0.025 mg/24 hr ptsw by TransDERmal route.  doxycycline (VIBRA-TABS) 100 mg tablet Take 1 Tablet by mouth two (2) times a day for 10 days. Indications: an infection of the skin and the tissue below the skin 20 Tablet 0    omeprazole (PRILOSEC) 10 mg capsule Take 10 mg by mouth daily.  albuterol (ACCUNEB) 1.25 mg/3 mL nebu Take 3 mL by inhalation every four (4) hours as needed (wheezing). 25 Each 0    QUEtiapine (SEROQUEL) 100 mg tablet Take 100 mg by mouth two (2) times a day.  polyethylene glycol (MIRALAX) 17 gram packet Take 1 Packet by mouth daily. 5 Packet 0    levothyroxine (SYNTHROID) 50 mcg tablet Take  by mouth Daily (before breakfast).  buPROPion (WELLBUTRIN) 100 mg tablet Take 100 mg by mouth two (2) times a day.  divalproex DR (DEPAKOTE) 250 mg tablet Take 500 mg by mouth three (3) times daily.  clonazePAM (KLONOPIN) 1 mg tablet Take 1 mg by mouth three (3) times daily.            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 No pertinent family history. Social History:  Social History     Tobacco Use    Smoking status: Never Smoker    Smokeless tobacco: Never Used   Substance Use Topics    Alcohol use: No    Drug use: No       Allergies: Allergies   Allergen Reactions    Compazine [Prochlorperazine Edisylate] Anaphylaxis    Sulfa (Sulfonamide Antibiotics) Anaphylaxis    Sulfa Dyne Anaphylaxis         Review of Systems   Review of Systems   Musculoskeletal: Positive for arthralgias and joint swelling. Skin: Positive for color change. Allergic/Immunologic: Negative for immunocompromised state. Neurological: Negative for weakness and numbness. All other systems reviewed and are negative. Physical Exam     Vitals:    07/18/21 0930   BP: 112/79   Pulse: 88   Resp: 20   Temp: 97.5 °F (36.4 °C)   SpO2: 100%   Weight: 72.6 kg (160 lb)   Height: 5' 6\" (1.676 m)     Physical Exam  Vitals and nursing note reviewed. Constitutional:       General: She is not in acute distress. Appearance: She is well-developed. She is not diaphoretic. Comments:  female in NAD. Alert. Appears comfortable. HENT:      Head: Normocephalic and atraumatic. Right Ear: External ear normal.      Left Ear: External ear normal.      Nose: Nose normal.   Eyes:      General: No scleral icterus. Right eye: No discharge. Left eye: No discharge.       Conjunctiva/sclera: Conjunctivae normal.   Cardiovascular:      Rate and Rhythm: Normal rate and regular rhythm. Pulses:           Dorsalis pedis pulses are 2+ on the left side. Posterior tibial pulses are 2+ on the left side. Heart sounds: Normal heart sounds. Pulmonary:      Effort: Pulmonary effort is normal. No tachypnea, accessory muscle usage or respiratory distress. Breath sounds: Normal breath sounds. No decreased breath sounds. Abdominal:      Palpations: Abdomen is soft. Musculoskeletal:         General: Normal range of motion. Cervical back: Normal range of motion. Left foot: Normal range of motion and normal capillary refill. Swelling (left third toe only) and tenderness present. Normal pulse. Feet:    Skin:     General: Skin is warm and dry. Neurological:      Mental Status: She is alert and oriented to person, place, and time. Psychiatric:         Judgment: Judgment normal.             Diagnostic Study Results     Labs -   No results found for this or any previous visit (from the past 12 hour(s)). XR FOOT LT MIN 3 V   Final Result      Soft tissue soreness present in the third digit. No radiopaque foreign body. No   acute osseous finding. CT Results  (Last 48 hours)    None        CXR Results  (Last 48 hours)    None          Medications given in the ED-  Medications   diph,Pertuss(AC),Tet Vac-PF (BOOSTRIX) suspension 0.5 mL (0.5 mL IntraMUSCular Given 7/18/21 0959)         Medical Decision Making   I am the first provider for this patient. I reviewed the vital signs, available nursing notes, past medical history, past surgical history, family history and social history. Vital Signs-Reviewed the patient's vital signs. Pulse Oximetry Analysis - 100% on RA. NORMAL     Records Reviewed: Nursing Notes    Provider Notes (Medical Decision Making): cellulitis, infected FB, fx, dermatitis. No appreciable abscess    Procedures:  Procedures    ED Course:   9:32 AM Initial assessment performed.  The patients presenting problems have been discussed, and they are in agreement with the care plan formulated and outlined with them. I have encouraged them to ask questions as they arise throughout their visit. Diagnosis and Disposition       Imaging with swelling and no radiopaque FB. NVI. No abscess. NVI. Will tx with ABX. Warm water soaks. Tdap updated as pt has lac on toe. Reasons to RTED discussed with pt. All questions answered. Pt feels comfortable going home at this time. Pt expressed understanding and she agrees with plan. 1. Cellulitis of third toe of left foot    2. Need for diphtheria-tetanus-pertussis (Tdap) vaccine        PLAN:  1. D/C Home  2. Current Discharge Medication List      START taking these medications    Details   doxycycline (VIBRA-TABS) 100 mg tablet Take 1 Tablet by mouth two (2) times a day for 10 days. Indications: an infection of the skin and the tissue below the skin  Qty: 20 Tablet, Refills: 0  Start date: 7/18/2021, End date: 7/28/2021           3. Follow-up Information     Follow up With Specialties Details Why Contact Info    Jasmyne Escalera, NP Nurse Practitioner   [phone] [facility]      [facility] EMERGENCY DEPT Emergency Medicine   [address] [facility] [address] [phone]        _______________________________    Attestations: This note is prepared by Mat Montoya PA-C.  ___    Please note that this dictation was completed with [employer], the computer voice recognition software. Quite often unanticipated grammatical, syntax, homophones, and other interpretive errors are inadvertently transcribed by the computer software. Please disregard these errors. Please excuse any errors that have escaped final proofreading.

## 2022-03-18 PROBLEM — R41.0 TRANSIENT CONFUSION: Status: ACTIVE | Noted: 2019-11-24

## 2022-03-19 PROBLEM — R20.0 LEFT ARM NUMBNESS: Status: ACTIVE | Noted: 2019-11-24

## 2022-05-08 ENCOUNTER — APPOINTMENT (OUTPATIENT)
Dept: GENERAL RADIOLOGY | Age: 52
End: 2022-05-08
Attending: PHYSICIAN ASSISTANT
Payer: MEDICAID

## 2022-05-08 ENCOUNTER — HOSPITAL ENCOUNTER (EMERGENCY)
Age: 52
Discharge: HOME | End: 2022-05-08
Attending: EMERGENCY MEDICINE
Payer: MEDICAID

## 2022-05-08 VITALS
TEMPERATURE: 97.5 F | BODY MASS INDEX: 24.11 KG/M2 | HEIGHT: 66 IN | OXYGEN SATURATION: 95 % | HEART RATE: 96 BPM | SYSTOLIC BLOOD PRESSURE: 92 MMHG | DIASTOLIC BLOOD PRESSURE: 67 MMHG | WEIGHT: 150 LBS | RESPIRATION RATE: 18 BRPM

## 2022-05-08 DIAGNOSIS — S90.852A FOREIGN BODY OF SKIN OF PLANTAR ASPECT OF LEFT FOOT: ICD-10-CM

## 2022-05-08 DIAGNOSIS — J01.90 ACUTE NON-RECURRENT SINUSITIS, UNSPECIFIED LOCATION: Primary | ICD-10-CM

## 2022-05-08 PROCEDURE — 73630 X-RAY EXAM OF FOOT: CPT

## 2022-05-08 PROCEDURE — 99283 EMERGENCY DEPT VISIT LOW MDM: CPT

## 2022-05-08 RX ORDER — DOXYCYCLINE 100 MG/1
100 CAPSULE ORAL 2 TIMES DAILY
Qty: 20 CAPSULE | Refills: 0 | Status: SHIPPED | OUTPATIENT
Start: 2022-05-08 | End: 2022-05-18

## 2022-05-08 RX ORDER — DOXYCYCLINE 100 MG/1
100 CAPSULE ORAL 2 TIMES DAILY
Qty: 20 CAPSULE | Refills: 0 | Status: SHIPPED | OUTPATIENT
Start: 2022-05-08 | End: 2022-05-08 | Stop reason: SDUPTHER

## 2022-05-08 NOTE — ED TRIAGE NOTES
Pt arrive to ed ambulatory with c/o sinus infection ( X 1.5 Weeks) and left foot pain. Green nasal drainage. Pt stated that she stepped on something two weeks ago, foot been hurting ever since.

## 2022-05-08 NOTE — ED PROVIDER NOTES
EMERGENCY DEPARTMENT HISTORY AND PHYSICAL EXAM    Date: 5/8/2022  Patient Name: Gaby Hedrick    History of Presenting Illness     Chief Complaint   Patient presents with    Sinus Infection    Foot Pain         History Provided By: Patient    9:04 AM  Gaby Hedrick is a 46 y.o. female with PMHX of IBS, bipolar disorder who presents to the emergency department C/O nasal congestion sinus pain and pressure with greenish nasal discharge x 1.5 weeks. Associated mild cough and postnasal drip. Patient also states she has pain and possible foreign body to the plantar aspect of the left forefoot after possibly stepping on an object 2 weeks ago. She continues to have pain  when she puts pressure on that part of her foot and sees a small dark sanya on her foot. Patient states she had COVID-19 about 5 months ago. Received 1 COVID-19 vaccine last year. Pt denies fever, sore throat, ear pain, and any other sxs or complaints. PCP: Melva Stroud NP    Current Outpatient Medications   Medication Sig Dispense Refill    doxycycline (MONODOX) 100 mg capsule Take 1 Capsule by mouth two (2) times a day for 10 days. 20 Capsule 0    estradioL 0.025 mg/24 hr ptsw by TransDERmal route.  omeprazole (PRILOSEC) 10 mg capsule Take 10 mg by mouth daily.  albuterol (ACCUNEB) 1.25 mg/3 mL nebu Take 3 mL by inhalation every four (4) hours as needed (wheezing). 25 Each 0    QUEtiapine (SEROQUEL) 100 mg tablet Take 100 mg by mouth two (2) times a day.  polyethylene glycol (MIRALAX) 17 gram packet Take 1 Packet by mouth daily. 5 Packet 0    levothyroxine (SYNTHROID) 50 mcg tablet Take  by mouth Daily (before breakfast).  buPROPion (WELLBUTRIN) 100 mg tablet Take 100 mg by mouth two (2) times a day.  divalproex DR (DEPAKOTE) 250 mg tablet Take 500 mg by mouth three (3) times daily.  clonazePAM (KLONOPIN) 1 mg tablet Take 1 mg by mouth three (3) times daily.            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 Social History:  Social History     Tobacco Use    Smoking status: Never Smoker    Smokeless tobacco: Never Used   Substance Use Topics    Alcohol use: No    Drug use: No       Allergies: Allergies   Allergen Reactions    Compazine [Prochlorperazine Edisylate] Anaphylaxis    Sulfa (Sulfonamide Antibiotics) Anaphylaxis    Sulfa Dyne Anaphylaxis         Review of Systems   Review of Systems   Constitutional: Negative for fever. HENT: Positive for congestion, sinus pressure and sinus pain. Negative for sore throat. Respiratory: Positive for cough. Musculoskeletal: Positive for joint swelling and myalgias. Skin: Positive for color change. Neurological: Negative for weakness and numbness. All other systems reviewed and are negative. Physical Exam     Vitals:    05/08/22 0834   BP: 92/67   Pulse: 96   Resp: 18   Temp: 97.5 °F (36.4 °C)   SpO2: 95%   Weight: 68 kg (150 lb)   Height: 5' 6\" (1.676 m)     Physical Exam  Vital signs and nursing notes reviewed. CONSTITUTIONAL: Alert. Well-appearing; well-nourished; in no apparent distress. HEAD: Normocephalic; atraumatic. EYES: PERRL; EOM's intact. No nystagmus. Conjunctiva clear. ENT: TM's normal. External ear normal. Normal nose; +congested, no rhinorrhea. Mild tenderness over maxillary sinuses. Normal pharynx.  Tonsils not enlarged without exudate. Moist mucus membranes. NECK: Supple; FROM without difficulty, non-tender; no cervical lymphadenopathy. CV: Normal S1, S2; no murmurs, rubs, or gallops. No chest wall tenderness. RESPIRATORY: Normal chest excursion with respiration; breath sounds clear and equal bilaterally; no wheezes, rhonchi, or rales. EXT: Normal ROM in all four extremities; Left foot: pinpoint black sanya on plantar aspect of forefoot with slight swelling and tenderness; no surrounding erythema discharge, bony tenderness or deformity. SKIN: Normal for age and race; warm; dry; good turgor; no apparent lesions or exudate. NEURO: A & O x3. Cranial nerves 2-12 intact. Motor 5/5 bilaterally. Sensation intact. PSYCH:  Mood and affect appropriate. Diagnostic Study Results     Labs -   No results found for this or any previous visit (from the past 12 hour(s)). Radiologic Studies -   XR FOOT LT MIN 3 V   Final Result   1. No acute fracture or dislocation. 2. Soft tissue swelling over the plantar MTP joints. No definite radiopaque   foreign body. 3. Osseous degenerative changes including moderate sized calcaneal spur. CT Results  (Last 48 hours)    None        CXR Results  (Last 48 hours)    None          Medications given in the ED-  Medications - No data to display      Medical Decision Making   I am the first provider for this patient. I reviewed the vital signs, available nursing notes, past medical history, past surgical history, family history and social history. Vital Signs-Reviewed the patient's vital signs. Records Reviewed: Nursing Notes      Procedures:  Procedures    ED Course:  9:04 AM   Initial assessment performed. The patients presenting problems have been discussed, and they are in agreement with the care plan formulated and outlined with them. I have encouraged them to ask questions as they arise throughout their visit.            Provider Notes (Medical Decision Making): Ramon Pepper is a 46 y.o. female presents with symptoms of sinusitis as well as pain to her left plantar foot after stepping on object 2 weeks ago. X-ray of the left foot shows no radiopaque soft tissue FB, however could have small FB such as wood splinter or other non-radiopaque FB. There are no signs of infection of foot, but doxycycline will cover both sinusitis as well preventing infection in the foot and patient to follow-up with podiatry for eval for possible foreign body removal. Pt declined COVID-19 test.    Diagnosis and Disposition       DISCHARGE NOTE:    Natalie Marrerotis's  results have been reviewed with her. She has been counseled regarding her diagnosis, treatment, and plan. She verbally conveys understanding and agreement of the signs, symptoms, diagnosis, treatment and prognosis and additionally agrees to follow up as discussed. She also agrees with the care-plan and conveys that all of her questions have been answered. I have also provided discharge instructions for her that include: educational information regarding their diagnosis and treatment, and list of reasons why they would want to return to the ED prior to their follow-up appointment, should her condition change. She has been provided with education for proper emergency department utilization. CLINICAL IMPRESSION:    1. Acute non-recurrent sinusitis, unspecified location    2. Foreign body of skin of plantar aspect of left foot        PLAN:  1. D/C Home  2. Current Discharge Medication List      START taking these medications    Details   doxycycline (MONODOX) 100 mg capsule Take 1 Capsule by mouth two (2) times a day for 10 days. Qty: 20 Capsule, Refills: 0  Start date: 5/8/2022, End date: 5/18/2022           3.    Follow-up Information     Follow up With Specialties Details Why Contact Info    Kacey Martin DPM Podiatry Schedule an appointment as soon as possible for a visit   [phone]  [phone]      [facility] EMERGENCY DEPT Emergency Medicine  As needed, If symptoms worsen [address] [place] [id number]  [phone]        _______________________________      Please note that this dictation was completed with [employer], the computer voice recognition software. Quite often unanticipated grammatical, syntax, homophones, and other interpretive errors are inadvertently transcribed by the computer software. Please disregard these errors. Please excuse any errors that have escaped final proofreading.

## 2023-01-09 ENCOUNTER — APPOINTMENT (OUTPATIENT)
Dept: GENERAL RADIOLOGY | Age: 53
End: 2023-01-09
Attending: EMERGENCY MEDICINE
Payer: MEDICAID

## 2023-01-09 ENCOUNTER — HOSPITAL ENCOUNTER (EMERGENCY)
Age: 53
Discharge: HOME | End: 2023-01-09
Attending: EMERGENCY MEDICINE
Payer: MEDICAID

## 2023-01-09 VITALS
SYSTOLIC BLOOD PRESSURE: 120 MMHG | DIASTOLIC BLOOD PRESSURE: 73 MMHG | BODY MASS INDEX: 26.66 KG/M2 | HEART RATE: 90 BPM | HEIGHT: 65 IN | RESPIRATION RATE: 18 BRPM | WEIGHT: 160 LBS | TEMPERATURE: 97.5 F | OXYGEN SATURATION: 97 %

## 2023-01-09 DIAGNOSIS — J45.40 MODERATE PERSISTENT ASTHMA WITHOUT COMPLICATION: ICD-10-CM

## 2023-01-09 DIAGNOSIS — J18.9 COMMUNITY ACQUIRED PNEUMONIA OF RIGHT LOWER LOBE OF LUNG: Primary | ICD-10-CM

## 2023-01-09 LAB
FLUAV RNA SPEC QL NAA+PROBE: NOT DETECTED
FLUBV RNA SPEC QL NAA+PROBE: NOT DETECTED
SARS-COV-2, COV2: NOT DETECTED

## 2023-01-09 PROCEDURE — 87636 SARSCOV2 & INF A&B AMP PRB: CPT

## 2023-01-09 PROCEDURE — 99283 EMERGENCY DEPT VISIT LOW MDM: CPT

## 2023-01-09 PROCEDURE — 71046 X-RAY EXAM CHEST 2 VIEWS: CPT

## 2023-01-09 RX ORDER — AZITHROMYCIN 250 MG/1
TABLET, FILM COATED ORAL
Qty: 6 TABLET | Refills: 0 | Status: SHIPPED | OUTPATIENT
Start: 2023-01-09 | End: 2023-01-14

## 2023-01-09 RX ORDER — IPRATROPIUM BROMIDE AND ALBUTEROL SULFATE 2.5; .5 MG/3ML; MG/3ML
3 SOLUTION RESPIRATORY (INHALATION)
Status: COMPLETED | OUTPATIENT
Start: 2023-01-09 | End: 2023-01-09

## 2023-01-09 RX ADMIN — IPRATROPIUM BROMIDE AND ALBUTEROL SULFATE 3 ML: 2.5; .5 SOLUTION RESPIRATORY (INHALATION) at 13:14

## 2023-01-09 NOTE — DISCHARGE INSTRUCTIONS
Follow up with your primary care provider. Return to the ED for worsening symptoms, or for other concerns.

## 2023-01-09 NOTE — ED PROVIDER NOTES
EMERGENCY DEPARTMENT HISTORY AND PHYSICAL EXAM      Date: 1/9/2023  Patient Name: Jadon Stanford    History of Presenting Illness     Chief Complaint   Patient presents with    Cough       History Provided By: Patient     History Santo Ace):   12:16 PM  Jadon Stanford is a 45 y.o. female with a PMHx of asthma, tracheostomy, pulmonary fibrosis  who presents to the emergency department (room 15) by POV C/O cough productive of green sputum onset 1 week ago. Associated sxs include mild wheezing. Pt denies fevers, chills or any other sxs or complaints. Patient states history of tracheostomy. She has a chronically hoarse voice. No vocal changes with this illness. PCP: Lottie Erickson NP     Past History     Past Medical History:  Past Medical History:   Diagnosis Date    Asthma     Bulimia     i throw up alot    Gastrointestinal disorder     ibs    Hypothyroid     Ill-defined condition     pre-diabetes    Osteoarthritis     Psychiatric disorder     bipolar    Pulmonary fibrosis (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 Social History:  Social History     Tobacco Use    Smoking status: Never    Smokeless tobacco: Never   Substance Use Topics    Alcohol use: No    Drug use: No       Medications:  Current Outpatient Medications   Medication Sig Dispense Refill    azithromycin (Zithromax Z-Obi) 250 mg tablet Take 2 Tablets by mouth daily for 1 day, THEN 1 Tablet daily for 4 days. 6 Tablet 0    estradioL 0.025 mg/24 hr ptsw by TransDERmal route. omeprazole (PRILOSEC) 10 mg capsule Take 10 mg by mouth daily. albuterol (ACCUNEB) 1.25 mg/3 mL nebu Take 3 mL by inhalation every four (4) hours as needed (wheezing). 25 Each 0    QUEtiapine (SEROQUEL) 100 mg tablet Take 100 mg by mouth two (2) times a day. polyethylene glycol (MIRALAX) 17 gram packet Take 1 Packet by mouth daily. 5 Packet 0    levothyroxine (SYNTHROID) 50 mcg tablet Take  by mouth Daily (before breakfast). buPROPion (WELLBUTRIN) 100 mg tablet Take 100 mg by mouth two (2) times a day. divalproex DR (DEPAKOTE) 250 mg tablet Take 500 mg by mouth three (3) times daily. clonazePAM (KLONOPIN) 1 mg tablet Take 1 mg by mouth three (3) times daily. Allergies: Allergies   Allergen Reactions    Compazine [Prochlorperazine Edisylate] Anaphylaxis    Sulfa (Sulfonamide Antibiotics) Anaphylaxis    Sulfa Dyne Anaphylaxis       Social Determinants of Health:  Social Determinants of Health     Tobacco Use: Not on file   Alcohol Use: Not on file   Financial Resource Strain: Not on file   Food Insecurity: Not on file   Transportation Needs: Not on file   Physical Activity: Not on file   Stress: Not on file   Social Connections: Not on file   Intimate Partner Violence: Not on file   Depression: Not on file   Housing Stability: Not on file       Review of Systems      Review of Systems   Respiratory:  Positive for cough and wheezing. All other systems reviewed and are negative. Physical Exam     Vitals:    01/09/23 1302 01/09/23 1336 01/09/23 1351 01/09/23 1428   BP: 113/67 (!) 111/99 117/66 120/73   Pulse:       Resp:       Temp:       SpO2: 95% 95% 98% 97%   Weight:       Height:           Physical Exam  Vitals and nursing note reviewed. Constitutional:       General: She is not in acute distress. Appearance: Normal appearance. HENT:      Head: Normocephalic and atraumatic. Eyes:      Extraocular Movements: Extraocular movements intact. Conjunctiva/sclera: Conjunctivae normal.      Pupils: Pupils are equal, round, and reactive to light. Neck:     Cardiovascular:      Rate and Rhythm: Normal rate and regular rhythm. Heart sounds: No murmur heard. No friction rub. No gallop. Pulmonary:      Effort: Pulmonary effort is normal.      Breath sounds: Examination of the right-middle field reveals wheezing and rales. Examination of the left-middle field reveals rales. Examination of the right-lower field reveals rales. Examination of the left-lower field reveals rales. Wheezing and rales present. No rhonchi. Abdominal:      General: Abdomen is flat. Bowel sounds are normal.      Palpations: Abdomen is soft. Tenderness: There is no abdominal tenderness. There is no guarding or rebound. Musculoskeletal:         General: Normal range of motion. Skin:     General: Skin is warm and dry. Neurological:      General: No focal deficit present. Mental Status: She is alert and oriented to person, place, and time. Psychiatric:         Mood and Affect: Mood normal.         Behavior: Behavior normal.       Diagnostic Study Results     Labs:  Recent Results (from the past 12 hour(s))   COVID-19 WITH INFLUENZA A/B    Collection Time: 01/09/23  1:16 PM   Result Value Ref Range    SARS-CoV-2 by PCR Not detected NOTD      Influenza A by PCR Not detected NOTD      Influenza B by PCR Not detected NOTD         Radiologic Studies:   XR CHEST PA LAT   Final Result      Progressive opacity right cardiophrenic angle, not confirmed on lateral, but in   keeping with age indeterminate atelectasis/infiltrate. CT Results  (Last 48 hours)      None          CXR Results  (Last 48 hours)                 01/09/23 1350  XR CHEST PA LAT Final result    Impression:      Progressive opacity right cardiophrenic angle, not confirmed on lateral, but in   keeping with age indeterminate atelectasis/infiltrate. Narrative:  HISTORY:    -Provided with order: productive cough   -Additional: None       Technique: CHEST PA AND LATERAL VIEWS       Comparison study: 11/24/19       FINDINGS:       HEART AND MEDIASTINUM: Unremarkable.        LUNGS AND PLEURAL SPACES: Streaky opacities are present in the right infrahilar   region. This is not well appreciated on the lateral radiograph. The right hilum   is slightly more prominent than the contralateral left, but not clearly changed   from prior. No pleural effusion. Negative for pneumothorax. BONY THORAX AND SOFT TISSUES: Right 4th and 5th ribs appear partially fused. Procedures     Procedures    ED Course     12:16 PM I Cony Heaton MD) am the first provider for this patient. Initial assessment performed. I reviewed the vital signs, available nursing notes, past medical history, past surgical history, family history and social history. The patients presenting problems have been discussed, and they are in agreement with the care plan formulated and outlined with them. I have encouraged them to ask questions as they arise throughout their visit. Records Reviewed: Nursing Notes    Cardiac Monitor:  Rate: 90 bpm  Rhythm: Sinus Rhythm    Pulse Oximetry Analysis - 98% on RA    MEDICATIONS ADMINISTERED IN THE ED:  Medications   albuterol-ipratropium (DUO-NEB) 2.5 MG-0.5 MG/3 ML (3 mL Nebulization Given 1/9/23 1314)       ED Course as of 01/09/23 1506   Mon Jan 09, 2023   1234 Offered patient oral prednisone for wheezing. Patient declined [ЕЛЕНА]   1400 RADIOLOGY FINDINGS:  Chest X-ray shows no acute cardiopulmonary process. Interpreted by Cony eHaton MD.  Pending review by Radiologist   [ЕЛЕНА]   1500 Reevaluated patient, wheezing resolved. [JM]      ED Course User Index  [ЕЛЕНА] Beth Ulrich MD       Medical Decision Making     DDX: Asthma exacerbation, URI, pneumonia, COVID, influenza    DISCUSSION:  This appears to be a mild contion. This appears to be an acute condition. 45 y.o. female with a history of asthma and remote tracheostomy with signs of a upper respiratory infection.  In evaluation of the above differential diagnosis, consideration was given to the following tests and treatments. Influenza and COVID were tested and both were negative. Chest x-ray was performed for signs of pneumonia and has a possible right lower lobe infiltrate. The decision to perform testing and results were discussed with the patient. Patient had nebulizers done in the ED. She had mild coarseness and wheezing in the lungs with good air movement initially. Patient declined steroids. Lungs cleared well with single nebulizer. X-ray has some possible signs of pneumonia so we will cover the patient with antibiotic as outpatient. I discussed each of these tests and considerations with the patient and family. The patient and family agrees with the plan of discharge. Additional Considerations:  Medications considered in the ED, but not provided include: Steroids (specifically oral prednisone 60 mg) because patient declined follow-up. Diagnosis and Disposition     3:06 PM   DISCHARGE NOTE:  Bricerosalbaleigh Huang Pancho's results have been reviewed with her. She has been counseled regarding her diagnosis, treatment, and plan. She verbally conveys understanding and agreement of the signs, symptoms, diagnosis, treatment and prognosis and additionally agrees to follow up as discussed. She also agrees with the care-plan and conveys that all of her questions have been answered. I have also provided discharge instructions for her that include: educational information regarding their diagnosis and treatment, and list of reasons why they would want to return to the ED prior to their follow-up appointment, should her condition change. She has been provided with education for proper emergency department utilization. CLINICAL IMPRESSION:    1. Community acquired pneumonia of right lower lobe of lung    2. Moderate persistent asthma without complication        PLAN:  1. D/C Home  2.    Current Discharge Medication List        START taking these medications    Details   azithromycin (Zithromax Z-Obi) 250 mg tablet Take 2 Tablets by mouth daily for 1 day, THEN 1 Tablet daily for 4 days. Qty: 6 Tablet, Refills: 0  Start date: 1/9/2023, End date: 1/14/2023           3. Follow-up Information       Follow up With Specialties Details Why Contact Seth Jones NP Nurse Practitioner Schedule an appointment as soon as possible for a visit  As soon as possible, For follow up from Emergency Department visit. Alex [address] [facility]      THE [facility] EMERGENCY DEPT Emergency Medicine  As needed; If symptoms worsen [address] [place] [phone] [facility] am the primary clinician of record. [employer] Disclaimer     Please note that this dictation was completed with [employer], the computer voice recognition software. Quite often unanticipated grammatical, syntax, homophones, and other interpretive errors are inadvertently transcribed by the computer software. Please disregard these errors. Please excuse any errors that have escaped final proofreading.     Samia Up MD

## 2023-01-09 NOTE — LETTER
[facility] [place] [place]  [facility]  [address]  [facility] [id number] [phone]    Work/School Note    Date: 1/9/2023    To Whom It May concern:    Lyly Powell was seen and treated today in the emergency room by the following provider(s):  Attending Provider: Jerry Monet MD.      Lyly Powell may return to work on 13 Jan 2022.     Sincerely,          Rebecca Connor MD

## 2023-02-18 ENCOUNTER — APPOINTMENT (OUTPATIENT)
Facility: HOSPITAL | Age: 53
End: 2023-02-18
Payer: MEDICAID

## 2023-02-18 ENCOUNTER — HOSPITAL ENCOUNTER (EMERGENCY)
Facility: HOSPITAL | Age: 53
Discharge: HOME | End: 2023-02-18
Attending: STUDENT IN AN ORGANIZED HEALTH CARE EDUCATION/TRAINING PROGRAM
Payer: MEDICAID

## 2023-02-18 VITALS
DIASTOLIC BLOOD PRESSURE: 65 MMHG | HEART RATE: 77 BPM | WEIGHT: 160 LBS | OXYGEN SATURATION: 98 % | BODY MASS INDEX: 26.66 KG/M2 | TEMPERATURE: 97.3 F | SYSTOLIC BLOOD PRESSURE: 116 MMHG | RESPIRATION RATE: 14 BRPM | HEIGHT: 65 IN

## 2023-02-18 DIAGNOSIS — R07.81 PLEURITIC PAIN: ICD-10-CM

## 2023-02-18 DIAGNOSIS — M54.9 UPPER BACK PAIN ON RIGHT SIDE: Primary | ICD-10-CM

## 2023-02-18 LAB
ALBUMIN SERPL-MCNC: 3.6 G/DL (ref 3.4–5)
ALBUMIN/GLOB SERPL: 1 (ref 0.8–1.7)
ALP SERPL-CCNC: 55 U/L (ref 45–117)
ALT SERPL-CCNC: 17 U/L (ref 13–56)
ANION GAP SERPL CALC-SCNC: 3 MMOL/L (ref 3–18)
AST SERPL-CCNC: 5 U/L (ref 10–38)
BASOPHILS # BLD: 0.1 K/UL (ref 0–0.1)
BASOPHILS NFR BLD: 1 % (ref 0–2)
BILIRUB SERPL-MCNC: 0.3 MG/DL (ref 0.2–1)
BUN SERPL-MCNC: 20 MG/DL (ref 7–18)
BUN/CREAT SERPL: 22 (ref 12–20)
CALCIUM SERPL-MCNC: 8.9 MG/DL (ref 8.5–10.1)
CHLORIDE SERPL-SCNC: 106 MMOL/L (ref 100–111)
CO2 SERPL-SCNC: 31 MMOL/L (ref 21–32)
CREAT SERPL-MCNC: 0.91 MG/DL (ref 0.6–1.3)
D DIMER PPP FEU-MCNC: <0.27 UG/ML(FEU)
DIFFERENTIAL METHOD BLD: ABNORMAL
EOSINOPHIL # BLD: 0.1 K/UL (ref 0–0.4)
EOSINOPHIL NFR BLD: 1 % (ref 0–5)
ERYTHROCYTE [DISTWIDTH] IN BLOOD BY AUTOMATED COUNT: 12.1 % (ref 11.6–14.5)
GLOBULIN SER CALC-MCNC: 3.6 G/DL (ref 2–4)
GLUCOSE SERPL-MCNC: 108 MG/DL (ref 74–99)
HCT VFR BLD AUTO: 39.3 % (ref 35–45)
HGB BLD-MCNC: 12.9 G/DL (ref 12–16)
IMM GRANULOCYTES # BLD AUTO: 0.1 K/UL (ref 0–0.04)
IMM GRANULOCYTES NFR BLD AUTO: 1 % (ref 0–0.5)
LYMPHOCYTES # BLD: 2.8 K/UL (ref 0.9–3.6)
LYMPHOCYTES NFR BLD: 32 % (ref 21–52)
MCH RBC QN AUTO: 31.2 PG (ref 24–34)
MCHC RBC AUTO-ENTMCNC: 32.8 G/DL (ref 31–37)
MCV RBC AUTO: 95.2 FL (ref 78–100)
MONOCYTES # BLD: 1 K/UL (ref 0.05–1.2)
MONOCYTES NFR BLD: 12 % (ref 3–10)
NEUTS SEG # BLD: 4.8 K/UL (ref 1.8–8)
NEUTS SEG NFR BLD: 55 % (ref 40–73)
NRBC # BLD: 0 K/UL (ref 0–0.01)
NRBC BLD-RTO: 0 PER 100 WBC
PLATELET # BLD AUTO: 227 K/UL (ref 135–420)
PMV BLD AUTO: 10 FL (ref 9.2–11.8)
POTASSIUM SERPL-SCNC: 3.5 MMOL/L (ref 3.5–5.5)
PROT SERPL-MCNC: 7.2 G/DL (ref 6.4–8.2)
RBC # BLD AUTO: 4.13 M/UL (ref 4.2–5.3)
SODIUM SERPL-SCNC: 140 MMOL/L (ref 136–145)
TROPONIN I SERPL HS-MCNC: 3 NG/L (ref 0–54)
WBC # BLD AUTO: 8.7 K/UL (ref 4.6–13.2)

## 2023-02-18 PROCEDURE — 71046 X-RAY EXAM CHEST 2 VIEWS: CPT

## 2023-02-18 PROCEDURE — 80053 COMPREHEN METABOLIC PANEL: CPT

## 2023-02-18 PROCEDURE — 84484 ASSAY OF TROPONIN QUANT: CPT

## 2023-02-18 PROCEDURE — 71275 CT ANGIOGRAPHY CHEST: CPT

## 2023-02-18 PROCEDURE — 85379 FIBRIN DEGRADATION QUANT: CPT

## 2023-02-18 PROCEDURE — 6360000004 HC RX CONTRAST MEDICATION: Performed by: NURSE PRACTITIONER

## 2023-02-18 PROCEDURE — 85025 COMPLETE CBC W/AUTO DIFF WBC: CPT

## 2023-02-18 PROCEDURE — 93005 ELECTROCARDIOGRAM TRACING: CPT | Performed by: STUDENT IN AN ORGANIZED HEALTH CARE EDUCATION/TRAINING PROGRAM

## 2023-02-18 PROCEDURE — 99285 EMERGENCY DEPT VISIT HI MDM: CPT

## 2023-02-18 RX ORDER — CYCLOBENZAPRINE HCL 5 MG
TABLET ORAL
Qty: 20 TABLET | Refills: 0 | Status: SHIPPED | OUTPATIENT
Start: 2023-02-18

## 2023-02-18 RX ADMIN — IOPAMIDOL 100 ML: 755 INJECTION, SOLUTION INTRAVENOUS at 15:22

## 2023-02-18 ASSESSMENT — PAIN SCALES - GENERAL: PAINLEVEL_OUTOF10: 6

## 2023-02-18 ASSESSMENT — PAIN DESCRIPTION - LOCATION: LOCATION: BACK

## 2023-02-18 ASSESSMENT — PAIN - FUNCTIONAL ASSESSMENT: PAIN_FUNCTIONAL_ASSESSMENT: 0-10

## 2023-02-18 NOTE — DISCHARGE INSTRUCTIONS
Continue OTC pain medications, heating pad as discussed. May also use muscle relaxer as prescribed, be aware that this medication may be sedating. Please return to ED for new or worsening symptoms to include chest pain, shortness of breath.

## 2023-02-18 NOTE — ED PROVIDER NOTES
[facility] EMERGENCY DEPT  EMERGENCY DEPARTMENT ENCOUNTER       Pt Name: Shay Durham  MRN: [id number]  [place] 1970  Date of evaluation: 2/18/2023  Provider: HONG Miner NP   PCP: HONG Smith NP  Note Started: 4:58 PM 2/18/23     CHIEF COMPLAINT       Chief Complaint   Patient presents with    Back Pain    Shortness of Breath        HISTORY OF PRESENT ILLNESS: 1 or more elements      History From: Patient  HPI Limitations: None  Chronic Conditions:   Asthma,bulimia   Social Determinants affecting Dx or Tx: none       Shay Durham is a 45 y.o. female who presents to ED c/o R upper back pleuritic pain x 2 days. Pt was treated 1 month ago for pneumonia in this department with improvement following treatment. 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 Pt denies cough, fever, chills, SOB, injury, unusual activity, leg swelling, recent surgery, CP. Pain denies pain on palpation or motion, states pain with inspiration only. PT has tried her albuterol treatment, OTC ibuprofen, and a heating pad with minimal relief. Unable to r/o PE with PERC score due to age and oral estrogen use. Wells score 0 at this time. Nursing Notes were all reviewed and agreed with or any disagreements were addressed in the HPI.     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 No pertinent family history. Social History:  Social History     Socioeconomic History    Marital status:      Spouse name: None    Number of children: None    Years of education: None    Highest education level: None   Tobacco Use    Smoking status: Never    Smokeless tobacco: Never   Substance and Sexual Activity    Alcohol use: No    Drug use: No       Allergies: Allergies   Allergen Reactions    Prochlorperazine Edisylate Anaphylaxis    Sulfa Antibiotics Anaphylaxis       CURRENT MEDICATIONS      No current facility-administered medications for this encounter. Current Outpatient Medications   Medication Sig Dispense Refill    cyclobenzaprine (FLEXERIL) 5 MG tablet Take 1-2 tablets by mouth twice daily as needed for muscle spasm. Start with 1 tablet. 20 tablet 0    albuterol (ACCUNEB) 1.25 MG/3ML nebulizer solution Inhale 1.25 mg into the lungs every 4 hours as needed      buPROPion (WELLBUTRIN) 100 MG tablet Take 100 mg by mouth 2 times daily      clonazePAM (KLONOPIN) 1 MG tablet Take 1 mg by mouth 3 times daily. divalproex (DEPAKOTE) 250 MG DR tablet Take 500 mg by mouth 3 times daily      estradiol 0.025 MG/24HR PTTW Place onto the skin      levothyroxine (SYNTHROID) 50 MCG tablet Take by mouth every morning (before breakfast)      omeprazole (PRILOSEC) 10 MG delayed release capsule Take 10 mg by mouth daily      polyethylene glycol (GLYCOLAX) 17 GM/SCOOP powder Take 17 g by mouth daily      QUEtiapine (SEROQUEL) 100 MG tablet Take 100 mg by mouth 2 times daily            PHYSICAL EXAM      Vitals:    02/18/23 1306   BP: 116/65   Pulse: 77   Resp: 14   Temp: 97.3 °F (36.3 °C)   TempSrc: Temporal   SpO2: 98%   Weight: 160 lb (72.6 kg)   Height: 5' 5\" (1.651 m)     Physical Exam  Vitals and nursing note reviewed. Constitutional:       General: She is not in acute distress. Appearance: She is well-developed.  She is not ill-appearing, toxic-appearing or diaphoretic. HENT:      Head: Normocephalic and atraumatic. Mouth/Throat:      Mouth: Mucous membranes are moist.   Eyes:      Conjunctiva/sclera: Conjunctivae normal.   Cardiovascular:      Rate and Rhythm: Normal rate and regular rhythm. Pulses: Normal pulses. Heart sounds: Normal heart sounds. Pulmonary:      Effort: Pulmonary effort is normal. No respiratory distress. Breath sounds: No wheezing, rhonchi or rales. Abdominal:      General: Abdomen is flat. Palpations: Abdomen is soft. Musculoskeletal:         General: No swelling or tenderness. Normal range of motion. Cervical back: Normal range of motion and neck supple. Thoracic back: No deformity, spasms or tenderness. Normal range of motion. Right lower leg: No edema. Left lower leg: No edema. Skin:     General: Skin is warm and dry. Capillary Refill: Capillary refill takes less than 2 seconds. Neurological:      General: No focal deficit present. Mental Status: She is alert and oriented to person, place, and time.    Psychiatric:         Mood and Affect: Mood normal.         Behavior: Behavior normal.          DIAGNOSTIC RESULTS   LABS:    Recent Results (from the past 24 hour(s))   EKG 12 Lead    Collection Time: 02/18/23  1:07 PM   Result Value Ref Range    Ventricular Rate 73 BPM    Atrial Rate 73 BPM    P-R Interval 126 ms    QRS Duration 94 ms    Q-T Interval 378 ms    QTc Calculation (Bazett) 416 ms    P Axis 38 degrees    R Axis 6 degrees    T Axis 11 degrees    Diagnosis Normal sinus rhythm    CBC with Auto Differential    Collection Time: 02/18/23  1:16 PM   Result Value Ref Range    WBC 8.7 4.6 - 13.2 K/uL    RBC 4.13 (L) 4.20 - 5.30 M/uL    Hemoglobin 12.9 12.0 - 16.0 g/dL    Hematocrit 39.3 35.0 - 45.0 %    MCV 95.2 78.0 - 100.0 FL    MCH 31.2 24.0 - 34.0 PG    MCHC 32.8 31.0 - 37.0 g/dL    RDW 12.1 11.6 - 14.5 %    Platelets 311 050 - 788 K/uL MPV 10.0 9.2 - 11.8 FL    Nucleated RBCs 0.0 0  WBC    nRBC 0.00 0.00 - 0.01 K/uL    Seg Neutrophils 55 40 - 73 %    Lymphocytes 32 21 - 52 %    Monocytes 12 (H) 3 - 10 %    Eosinophils % 1 0 - 5 %    Basophils 1 0 - 2 %    Immature Granulocytes 1 (H) 0.0 - 0.5 %    Segs Absolute 4.8 1.8 - 8.0 K/UL    Absolute Lymph # 2.8 0.9 - 3.6 K/UL    Absolute Mono # 1.0 0.05 - 1.2 K/UL    Absolute Eos # 0.1 0.0 - 0.4 K/UL    Basophils Absolute 0.1 0.0 - 0.1 K/UL    Absolute Immature Granulocyte 0.1 (H) 0.00 - 0.04 K/UL    Differential Type AUTOMATED     CMP    Collection Time: 02/18/23  1:16 PM   Result Value Ref Range    Sodium 140 136 - 145 mmol/L    Potassium 3.5 3.5 - 5.5 mmol/L    Chloride 106 100 - 111 mmol/L    CO2 31 21 - 32 mmol/L    Anion Gap 3 3.0 - 18 mmol/L    Glucose 108 (H) 74 - 99 mg/dL    BUN 20 (H) 7.0 - 18 MG/DL    Creatinine 0.91 0.6 - 1.3 MG/DL    Bun/Cre Ratio 22 (H) 12 - 20      Est, Glom Filt Rate >60 >60 ml/min/1.73m2    Calcium 8.9 8.5 - 10.1 MG/DL    Total Bilirubin 0.3 0.2 - 1.0 MG/DL    ALT 17 13 - 56 U/L    AST 5 (L) 10 - 38 U/L    Alk Phosphatase 55 45 - 117 U/L    Total Protein 7.2 6.4 - 8.2 g/dL    Albumin 3.6 3.4 - 5.0 g/dL    Globulin 3.6 2.0 - 4.0 g/dL    Albumin/Globulin Ratio 1.0 0.8 - 1.7     D-Dimer, Quantitative    Collection Time: 02/18/23  1:16 PM   Result Value Ref Range    D-Dimer, Quant <0.27 <0.46 ug/ml(FEU)   Troponin    Collection Time: 02/18/23  1:16 PM   Result Value Ref Range    Troponin, High Sensitivity 3 0 - 54 ng/L       Labs Reviewed   CBC WITH AUTO DIFFERENTIAL - Abnormal; Notable for the following components:       Result Value    RBC 4.13 (*)     Monocytes 12 (*)     Immature Granulocytes 1 (*)     Absolute Immature Granulocyte 0.1 (*)     All other components within normal limits   COMPREHENSIVE METABOLIC PANEL - Abnormal; Notable for the following components:    Glucose 108 (*)     BUN 20 (*)     Bun/Cre Ratio 22 (*)     AST 5 (*)     All other components within normal limits   D-DIMER, QUANTITATIVE   TROPONIN         EKG: When ordered, EKG's are interpreted by the Emergency Department Provider in the absence of a cardiologist.  Please see their note for interpretation of EKG. NSR, Rate 73, non-specific t wave inversion on lead III only. Read by me and Dr. Farzana Ozuna:  Non-plain film images such as CT, Ultrasound and MRI are read by the radiologist. Plain radiographic images are visualized and preliminarily interpreted by the ED Provider with the below findings:       Read by me, pending review by radiologist.     Interpretation per the Radiologist below, if available at the time of this note:  CTA CHEST W WO CONTRAST PE Eval   Final Result   No acute pulmonary emboli. XR CHEST (2 VW)   Final Result   No acute process                 PROCEDURES   Unless otherwise noted below, none  Procedures         CRITICAL CARE TIME   None    EMERGENCY DEPARTMENT COURSE and DIFFERENTIAL DIAGNOSIS/MDM   Vitals:    Vitals:    02/18/23 1306   BP: 116/65   Pulse: 77   Resp: 14   Temp: 97.3 °F (36.3 °C)   TempSrc: Temporal   SpO2: 98%   Weight: 160 lb (72.6 kg)   Height: 5' 5\" (1.651 m)       Patient was given the following medications:  Medications   iopamidol (ISOVUE-370) 76 % injection 100 mL (100 mLs IntraVENous Given 2/18/23 1522)           Records Reviewed (source and summary): Old medical records. Nursing notes. Previous radiology studies,\"\"None. ED COURSE  ED Course as of 02/18/23 1658   Sat Feb 18, 2023   1456 Normal sinus rhythm at 73 bpm, QTc is 416, no ST elevations or depressions, no STEMI. EKG is interpreted by me [NN]   1458 Wells score 0, CXR, labs largely unremarkable. Top and D-dimer negative. However, proceeding with CTA as pleuritic CP remains and pain is not reproducible with motion or palpation. Patient and Dr. Kylie Marvin in agreement with plan of care. Pt declines pain medication at this time.    [TC]      ED Course User Index  [NN] Nerissa Small DO  [TC] HONG Abdi NP       Medial Decision Making:  DDX: M/S strain, PE, ACS, pneumonia, pleurisy, pneumothorax.    52 y.o. female presenting to the ED today for two days of R upper back pain with inspiration x 2 days. Pt has a hx of asthma and was treated for pneumonia 1 month ago.  CXR does not show consolidation or pneumothorax today, no leucocytosis, wells score 0, D-dimer and troponin WNL.  Pt has had no tachycardia, tachypnea or increased respiratory effort during evaluation.  Given risk factors for PE, CTA was performed. CTA negative for PE.  I discussed each of these tests and considerations with the patient. I feel her workup is most consistent with musculoskeletal strain at this time and that patient can be discharged with return precautions for ED and PCP follow-up. They agree with the plan of discharge.      FINAL IMPRESSION     1. Upper back pain on right side    2. Pleuritic pain            DISPOSITION/PLAN   DISPOSITION Decision To Discharge 02/18/2023 04:29:52 PM      Discharged       PATIENT REFERRED TO:  Rosy Estrella APRN - NP  [address] [facility] [address] [phone]    Schedule an appointment as soon as possible for a visit in 1 week      [facility] EMERGENCY DEPT  [address] [facility] [address] [phone]    As needed, If symptoms worsen       DISCHARGE MEDICATIONS:     Medication List        START taking these medications      cyclobenzaprine 5 MG tablet  Commonly known as: FLEXERIL  Take 1-2 tablets by mouth twice daily as needed for muscle spasm. Start with 1 tablet.            ASK your doctor about these medications      albuterol 1.25 MG/3ML nebulizer solution  Commonly known as: ACCUNEB     buPROPion 100 MG tablet  Commonly known as: WELLBUTRIN     clonazePAM 1 MG tablet  Commonly known as: KLONOPIN     divalproex 250 MG DR tablet  Commonly known as: DEPAKOTE     estradiol 0.025 MG/24HR Pttw     levothyroxine 50 MCG tablet  Commonly  known as: SYNTHROID     omeprazole 10 MG delayed release capsule  Commonly known as: PRILOSEC     polyethylene glycol 17 GM/SCOOP powder  Commonly known as: GLYCOLAX     QUEtiapine 100 MG tablet  Commonly known as: SEROQUEL               Where to Get Your Medications        These medications were sent to [address] [place] [address]      Hours: 24-hours Phone: [phone]   cyclobenzaprine 5 MG tablet                    I am the Primary Clinician of Record. (Please note that parts of this dictation were completed with voice recognition software. Quite often unanticipated grammatical, syntax, homophones, and other interpretive errors are inadvertently transcribed by the computer software. Please disregards these errors.  Please excuse any errors that have escaped final proofreading.)       HONG Arce NP  02/18/23 4061

## 2023-02-19 LAB
EKG ATRIAL RATE: 73 BPM
EKG DIAGNOSIS: NORMAL
EKG P AXIS: 38 DEGREES
EKG P-R INTERVAL: 126 MS
EKG Q-T INTERVAL: 378 MS
EKG QRS DURATION: 94 MS
EKG QTC CALCULATION (BAZETT): 416 MS
EKG R AXIS: 6 DEGREES
EKG T AXIS: 11 DEGREES
EKG VENTRICULAR RATE: 73 BPM

## 2024-02-07 ENCOUNTER — APPOINTMENT (OUTPATIENT)
Facility: HOSPITAL | Age: 54
End: 2024-02-07
Payer: MEDICAID

## 2024-02-07 ENCOUNTER — HOSPITAL ENCOUNTER (EMERGENCY)
Facility: HOSPITAL | Age: 54
Discharge: HOME | End: 2024-02-07
Attending: EMERGENCY MEDICINE
Payer: MEDICAID

## 2024-02-07 VITALS
RESPIRATION RATE: 14 BRPM | BODY MASS INDEX: 26.66 KG/M2 | TEMPERATURE: 98.5 F | HEIGHT: 65 IN | OXYGEN SATURATION: 97 % | WEIGHT: 160 LBS | HEART RATE: 92 BPM | DIASTOLIC BLOOD PRESSURE: 68 MMHG | SYSTOLIC BLOOD PRESSURE: 105 MMHG

## 2024-02-07 DIAGNOSIS — J45.21 MILD INTERMITTENT ASTHMA WITH EXACERBATION: Primary | ICD-10-CM

## 2024-02-07 DIAGNOSIS — J10.1 INFLUENZA A: ICD-10-CM

## 2024-02-07 LAB
FLUAV RNA SPEC QL NAA+PROBE: DETECTED
FLUBV RNA SPEC QL NAA+PROBE: NOT DETECTED
SARS-COV-2 RNA RESP QL NAA+PROBE: NOT DETECTED

## 2024-02-07 PROCEDURE — 99284 EMERGENCY DEPT VISIT MOD MDM: CPT

## 2024-02-07 PROCEDURE — 87636 SARSCOV2 & INF A&B AMP PRB: CPT

## 2024-02-07 PROCEDURE — 71045 X-RAY EXAM CHEST 1 VIEW: CPT

## 2024-02-07 PROCEDURE — 6370000000 HC RX 637 (ALT 250 FOR IP): Performed by: EMERGENCY MEDICINE

## 2024-02-07 RX ORDER — PREDNISONE 20 MG/1
40 TABLET ORAL DAILY
Qty: 8 TABLET | Refills: 0 | Status: SHIPPED | OUTPATIENT
Start: 2024-02-07 | End: 2024-02-11

## 2024-02-07 RX ORDER — IPRATROPIUM BROMIDE AND ALBUTEROL SULFATE 2.5; .5 MG/3ML; MG/3ML
1 SOLUTION RESPIRATORY (INHALATION)
Status: COMPLETED | OUTPATIENT
Start: 2024-02-07 | End: 2024-02-07

## 2024-02-07 RX ADMIN — IPRATROPIUM BROMIDE AND ALBUTEROL SULFATE 1 DOSE: .5; 3 SOLUTION RESPIRATORY (INHALATION) at 09:00

## 2024-02-07 ASSESSMENT — PAIN - FUNCTIONAL ASSESSMENT: PAIN_FUNCTIONAL_ASSESSMENT: NONE - DENIES PAIN

## 2024-02-07 NOTE — DISCHARGE INSTRUCTIONS
Follow-up with your primary care doctor.  Return to ED for worsening symptoms or for other concerns.  He should wear a mask in public for at least 1 week until symptoms are resolved.

## 2024-02-07 NOTE — ED NOTES
Paperwork read with patient making note of where to  prescriptions     IV taken out.    Armband removed and disposed of in shredder.     Patient has no further questions at this time.     Will discharge from system.

## 2024-02-07 NOTE — ED PROVIDER NOTES
[facility] EMERGENCY DEPT  EMERGENCY DEPARTMENT ENCOUNTER    Patient Name: Flori Garcia  MRN: [id number]  YOB: 1970  Provider: Mars Chambers MD  PCP: Rosy Estrella APRN - NP   Time/Date of evaluation: 7:32 AM EST on 2/7/24    History of Presenting Illness     Chief Complaint   Patient presents with    Cough    Generalized Body Aches       History Provided by: Patient   History is limited by: Nothing    HISTORY (Narative):   Flori Garcia is a 53 y.o. female with a PMHX of asthma, hypothyroidism, pulmonary fibrosis  who presents to the emergency department (room 10) by POV C/O wheezing onset several days ago. Associated sxs include cough, congestion, myalgias. Patient denies any other sxs or complaints.  Patient denies any improvement with home medications (albuterol nebulizer).  Patient states that she normally gets relief with azithromycin.    Nursing Notes were all reviewed and agreed with or any disagreements were addressed in the HPI.    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 No pertinent family history.    SOCIAL HISTORY:  Social History     Tobacco Use    Smoking